# Patient Record
Sex: FEMALE | Race: WHITE | NOT HISPANIC OR LATINO | Employment: FULL TIME | URBAN - METROPOLITAN AREA
[De-identification: names, ages, dates, MRNs, and addresses within clinical notes are randomized per-mention and may not be internally consistent; named-entity substitution may affect disease eponyms.]

---

## 2024-01-28 PROBLEM — F33.0 MILD EPISODE OF RECURRENT MAJOR DEPRESSIVE DISORDER (HCC): Status: ACTIVE | Noted: 2021-03-22

## 2024-01-28 PROBLEM — M47.816 FACET ARTHROPATHY, LUMBAR: Status: ACTIVE | Noted: 2020-12-14

## 2024-01-28 PROBLEM — M79.7 FIBROMYALGIA: Status: ACTIVE | Noted: 2019-09-25

## 2024-01-28 PROBLEM — K21.9 GASTROESOPHAGEAL REFLUX DISEASE: Status: ACTIVE | Noted: 2022-02-09

## 2024-01-28 PROBLEM — M25.761: Status: ACTIVE | Noted: 2021-09-27

## 2024-01-28 PROBLEM — R00.2 PALPITATIONS: Status: ACTIVE | Noted: 2023-09-13

## 2024-01-28 PROBLEM — M25.762: Status: ACTIVE | Noted: 2021-09-27

## 2024-01-28 PROBLEM — E55.9 VITAMIN D DEFICIENCY: Status: ACTIVE | Noted: 2020-09-16

## 2024-01-28 PROBLEM — I49.3 MULTIPLE PREMATURE VENTRICULAR COMPLEXES: Status: ACTIVE | Noted: 2023-09-07

## 2024-01-28 PROBLEM — M48.061 SPINAL STENOSIS OF LUMBAR REGION: Status: ACTIVE | Noted: 2020-12-14

## 2024-01-28 PROBLEM — G47.33 OBSTRUCTIVE SLEEP APNEA SYNDROME: Status: ACTIVE | Noted: 2023-09-07

## 2024-01-28 PROBLEM — E78.1 HYPERTRIGLYCERIDEMIA: Status: ACTIVE | Noted: 2023-09-13

## 2024-01-29 ENCOUNTER — OFFICE VISIT (OUTPATIENT)
Age: 54
End: 2024-01-29
Payer: COMMERCIAL

## 2024-01-29 VITALS
HEIGHT: 64 IN | DIASTOLIC BLOOD PRESSURE: 74 MMHG | RESPIRATION RATE: 18 BRPM | WEIGHT: 133.38 LBS | SYSTOLIC BLOOD PRESSURE: 120 MMHG | HEART RATE: 64 BPM | OXYGEN SATURATION: 97 % | BODY MASS INDEX: 22.77 KG/M2

## 2024-01-29 DIAGNOSIS — E55.9 VITAMIN D DEFICIENCY: ICD-10-CM

## 2024-01-29 DIAGNOSIS — R00.2 PALPITATIONS: ICD-10-CM

## 2024-01-29 DIAGNOSIS — R53.83 FATIGUE, UNSPECIFIED TYPE: ICD-10-CM

## 2024-01-29 DIAGNOSIS — I49.3 MULTIPLE PREMATURE VENTRICULAR COMPLEXES: ICD-10-CM

## 2024-01-29 DIAGNOSIS — F33.0 MILD EPISODE OF RECURRENT MAJOR DEPRESSIVE DISORDER (HCC): ICD-10-CM

## 2024-01-29 DIAGNOSIS — Z12.31 ENCOUNTER FOR SCREENING MAMMOGRAM FOR BREAST CANCER: ICD-10-CM

## 2024-01-29 DIAGNOSIS — M79.7 FIBROMYALGIA: ICD-10-CM

## 2024-01-29 DIAGNOSIS — R25.1 TREMOR OF BOTH HANDS: Primary | ICD-10-CM

## 2024-01-29 PROBLEM — G47.33 OBSTRUCTIVE SLEEP APNEA SYNDROME: Status: RESOLVED | Noted: 2023-09-07 | Resolved: 2024-01-29

## 2024-01-29 PROCEDURE — 99204 OFFICE O/P NEW MOD 45 MIN: CPT | Performed by: NURSE PRACTITIONER

## 2024-01-29 RX ORDER — TOLTERODINE 4 MG/1
4 CAPSULE, EXTENDED RELEASE ORAL DAILY
COMMUNITY
Start: 2024-01-06

## 2024-01-29 RX ORDER — BUPROPION HYDROCHLORIDE 150 MG/1
TABLET ORAL
COMMUNITY
Start: 2023-08-14 | End: 2024-01-29

## 2024-01-29 RX ORDER — BUPROPION HYDROCHLORIDE 300 MG/1
300 TABLET ORAL EVERY MORNING
Qty: 90 TABLET | Refills: 0 | Status: SHIPPED | OUTPATIENT
Start: 2024-01-29 | End: 2025-01-23

## 2024-01-29 RX ORDER — PREGABALIN 50 MG/1
CAPSULE ORAL
COMMUNITY

## 2024-01-29 RX ORDER — OMEPRAZOLE 10 MG/1
CAPSULE, DELAYED RELEASE ORAL
COMMUNITY
Start: 2023-08-07

## 2024-01-29 RX ORDER — DICLOFENAC SODIUM 75 MG/1
TABLET, DELAYED RELEASE ORAL
COMMUNITY

## 2024-01-29 RX ORDER — ERGOCALCIFEROL 1.25 MG/1
CAPSULE ORAL
COMMUNITY

## 2024-01-29 NOTE — ASSESSMENT & PLAN NOTE
First noticed approx 1 year ago  Worse at rest over the past month    Not noted upon physical exam  Will start workup with baseline labs  If unremarkable, would recommend a follow-up with rheumatology ([facility] in [place])  If rheumatology follow-up is unremarkable, will consider a neurology referral

## 2024-01-29 NOTE — ASSESSMENT & PLAN NOTE
Continue cardiology follow-ups  Continue metoprolol as needed  Have magnesium level checked.  Ordered by cardiology, will have drawn with my labs.
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
## 2024-01-31 ENCOUNTER — TELEPHONE (OUTPATIENT)
Dept: ADMINISTRATIVE | Facility: OTHER | Age: 54
End: 2024-01-31

## 2024-01-31 NOTE — LETTER
Procedure Request Form: Colonoscopy      Date Requested: 24  Patient: Seema Lui  Patient : 1970   Referring Provider: SHARAD Malcolm        Date of Procedure ______________________________       The above patient has informed us that they have completed their   most recent Colonoscopy at your facility. Please complete   this form and attach all corresponding procedure reports/results.    Comments __________________________________________________________  ____________________________________________________________________  ____________________________________________________________________  ____________________________________________________________________    Facility Completing Procedure _________________________________________    Form Completed By (print name) _______________________________________      Signature __________________________________________________________      These reports are needed for  compliance.    Please fax this completed form and a copy of the procedure report to our office located at [address] [place] [address] as soon as possible to Fax [phone] zofia Simpson: Phone [phone]    We thank you for your assistance in treating our mutual patient.

## 2024-01-31 NOTE — TELEPHONE ENCOUNTER
Upon review of the In Basket request and the patient's chart, initial outreach has been made via fax to facility. Please see Contacts section for details.     Thank you  Tatiana Rizvi

## 2024-01-31 NOTE — TELEPHONE ENCOUNTER
----- Message from SHARAD Malcolm sent at 1/28/2024  3:10 PM EST -----  Regarding: Care Gap Request  01/28/24 3:10 PM    Hello, our patient attached above has had CRC: Colonoscopy completed/performed. Please assist in updating the patient chart by making an External outreach to [facility] ( [facility]) facility located in [place], [place].    Thank you,  SHARAD Malcolm  [facility] PRIMARY CARE

## 2024-02-03 LAB — HBA1C MFR BLD HPLC: 5.1 %

## 2024-02-07 NOTE — TELEPHONE ENCOUNTER
Upon review of the In Basket request we were able to locate, review, and update the patient chart as requested for CRC: Colonoscopy.    Any additional questions or concerns should be emailed to the Practice Liaisons via the appropriate education email address, please do not reply via In Basket.    Thank you  Tatiana Rizvi

## 2024-02-26 ENCOUNTER — RA CDI HCC (OUTPATIENT)
Dept: OTHER | Facility: HOSPITAL | Age: 54
End: 2024-02-26

## 2024-03-04 ENCOUNTER — OFFICE VISIT (OUTPATIENT)
Age: 54
End: 2024-03-04
Payer: COMMERCIAL

## 2024-03-04 VITALS
DIASTOLIC BLOOD PRESSURE: 84 MMHG | HEIGHT: 64 IN | RESPIRATION RATE: 16 BRPM | OXYGEN SATURATION: 97 % | HEART RATE: 72 BPM | SYSTOLIC BLOOD PRESSURE: 128 MMHG | BODY MASS INDEX: 22.51 KG/M2 | WEIGHT: 131.84 LBS

## 2024-03-04 DIAGNOSIS — F33.0 MILD EPISODE OF RECURRENT MAJOR DEPRESSIVE DISORDER (HCC): ICD-10-CM

## 2024-03-04 DIAGNOSIS — F41.9 ANXIETY: ICD-10-CM

## 2024-03-04 DIAGNOSIS — R25.1 TREMOR OF BOTH HANDS: ICD-10-CM

## 2024-03-04 DIAGNOSIS — R29.818 TRANSIENT NEUROLOGICAL SYMPTOMS: Primary | ICD-10-CM

## 2024-03-04 PROCEDURE — 99213 OFFICE O/P EST LOW 20 MIN: CPT | Performed by: NURSE PRACTITIONER

## 2024-03-04 RX ORDER — BUPROPION HYDROCHLORIDE 150 MG/1
150 TABLET ORAL EVERY MORNING
Qty: 90 TABLET | Refills: 3 | Status: SHIPPED | OUTPATIENT
Start: 2024-03-04 | End: 2025-02-27

## 2024-03-05 NOTE — ASSESSMENT & PLAN NOTE
Per review of the chart, patient has been on duloxetine and escitalopram in the past but did not tolerate either well due to side effects.  The patient is concerned that increasing her bupropion from 150 mg daily to 300 mg daily may have contributed to her recent neurologic symptoms.  Patient wishes to stay on bupropion 150 mg daily at this time.

## 2024-03-05 NOTE — ASSESSMENT & PLAN NOTE
Anxiety > depression per patient report    Per review of the chart, patient has been on duloxetine and escitalopram in the past but did not tolerate either well due to side effects.  The patient is concerned that increasing her bupropion from 150 mg daily to 300 mg daily may have contributed to her recent neurologic symptoms.  Patient wishes to stay on bupropion 150 mg daily at this time.

## 2024-03-05 NOTE — PROGRESS NOTES
Name: Seema Poe      : 1970      MRN: [id number]  Encounter Provider: SHARAD Malcolm  Encounter Date: 3/4/2024   Encounter department: [facility] PRIMARY CARE    Assessment & Plan     Encouraged patient to keep and attend her neurology appointment in early April due to her history of hand tremors (now resolved) and her recent transient neurological symptoms.  Neuroexam unremarkable today.  Vital signs stable.  I also recommended that the patient follow-up with her rheumatology team, especially if her neurology workup is unremarkable.    Discussed what signs and symptoms warrant emergent medical care. Patient verbalized understanding.     Documentation for this encounter was completed with the assistance of dictation software.  Please excuse any grammatical errors.  Please contact the provider if any documentation clarification is necessary.      1. Transient neurological symptoms    2. Tremor of both hands    3. Anxiety  Assessment & Plan:  Per review of the chart, patient has been on duloxetine and escitalopram in the past but did not tolerate either well due to side effects.  The patient is concerned that increasing her bupropion from 150 mg daily to 300 mg daily may have contributed to her recent neurologic symptoms.  Patient wishes to stay on bupropion 150 mg daily at this time.      4. Mild episode of recurrent major depressive disorder (HCC)  Assessment & Plan:  Anxiety > depression per patient report    Per review of the chart, patient has been on duloxetine and escitalopram in the past but did not tolerate either well due to side effects.  The patient is concerned that increasing her bupropion from 150 mg daily to 300 mg daily may have contributed to her recent neurologic symptoms.  Patient wishes to stay on bupropion 150 mg daily at this time.    Orders:  -     buPROPion (WELLBUTRIN XL) 150 mg 24 hr tablet; Take 1 tablet (150 mg total) by mouth every morning            Subjective      Patient presents today for routine follow-up.  At last visit, patient's primary complaints were hand tremors and fatigue.  Lab workup was relatively unremarkable.    Patient was recently in the emergency room for concerning neurologic symptoms including poor gait, dysarthria, and lower extremity tremors.  Patient had an echocardiogram which was unremarkable.  Stroke workup was also negative.  Patient was noted to have a positive Lyme titer and was treated with doxycycline.  Patient saw infectious disease as a posthospital follow-up who determined that the Lyme titer testing was likely a false positive.  Patient is scheduled to see neurology in early April for further evaluation as the etiology of her symptoms are unclear.    Patient states today that all of her symptoms have resolved, including her hand tremors.  She states that she saw the [facility] and received a cervical spine injection last Thursday.      Review of Systems   Constitutional: Negative.    HENT: Negative.     Eyes: Negative.    Respiratory: Negative.     Cardiovascular: Negative.    Gastrointestinal: Negative.    Endocrine: Negative.    Genitourinary: Negative.    Musculoskeletal: Negative.    Skin: Negative.    Allergic/Immunologic: Negative.    Neurological: Negative.  Negative for dizziness, tremors, facial asymmetry and weakness.   Hematological: Negative.    Psychiatric/Behavioral: Negative.         Current Outpatient Medications on File Prior to Visit   Medication Sig   • B Complex Vitamins (VITAMIN B COMPLEX PO)    • Cetirizine HCl 10 MG CAPS Take by mouth   • diclofenac (VOLTAREN) 75 mg EC tablet    • ergocalciferol (VITAMIN D2) 50,000 units    • MAGNESIUM CITRATE PO Take 1 tablet by mouth every other day   • omeprazole (PriLOSEC) 10 mg delayed release capsule    • pregabalin (LYRICA) 50 mg capsule    • tolterodine (DETROL LA) 4 mg 24 hr capsule Take 4 mg by mouth daily   • TURMERIC PO Take by mouth   •  "metoprolol tartrate (LOPRESSOR) 25 mg tablet Take by mouth (Patient not taking: Reported on 1/29/2024)       Objective     /84 (BP Location: Right arm, Patient Position: Sitting, Cuff Size: Standard)   Pulse 72   Resp 16   Ht 5' 4\" (1.626 m)   Wt 59.8 kg (131 lb 13.4 oz)   SpO2 97%   BMI 22.63 kg/m²     Physical Exam  Constitutional:       General: She is not in acute distress.     Appearance: Normal appearance.   HENT:      Nose: Nose normal.   Eyes:      Extraocular Movements: Extraocular movements intact.      Right eye: Normal extraocular motion and no nystagmus.      Left eye: Normal extraocular motion and no nystagmus.      Conjunctiva/sclera: Conjunctivae normal.      Pupils: Pupils are equal, round, and reactive to light.      Right eye: Pupil is not sluggish.      Left eye: Pupil is not sluggish.   Cardiovascular:      Rate and Rhythm: Normal rate and regular rhythm.      Heart sounds: Normal heart sounds.   Pulmonary:      Effort: Pulmonary effort is normal. No respiratory distress.      Breath sounds: Normal breath sounds. No wheezing.   Abdominal:      General: Abdomen is flat.   Musculoskeletal:         General: No swelling or deformity. Normal range of motion.      Cervical back: Normal range of motion.   Skin:     Findings: No erythema or rash.   Neurological:      Mental Status: She is alert and oriented to person, place, and time.      Cranial Nerves: Cranial nerves 2-12 are intact. No dysarthria or facial asymmetry.      Sensory: Sensation is intact.      Motor: No weakness, tremor, seizure activity or pronator drift.      Coordination: Romberg sign negative. Finger-Nose-Finger Test and Heel to Shin Test normal. Rapid alternating movements normal.      Gait: Gait is intact.   Psychiatric:         Mood and Affect: Mood normal.         Behavior: Behavior normal.       SHARAD Malcolm    "

## 2024-05-13 ENCOUNTER — OFFICE VISIT (OUTPATIENT)
Age: 54
End: 2024-05-13
Payer: COMMERCIAL

## 2024-05-13 VITALS
SYSTOLIC BLOOD PRESSURE: 110 MMHG | HEART RATE: 70 BPM | HEIGHT: 64 IN | WEIGHT: 135.36 LBS | TEMPERATURE: 97.3 F | BODY MASS INDEX: 23.11 KG/M2 | OXYGEN SATURATION: 99 % | DIASTOLIC BLOOD PRESSURE: 70 MMHG

## 2024-05-13 DIAGNOSIS — M79.7 FIBROMYALGIA: Primary | ICD-10-CM

## 2024-05-13 DIAGNOSIS — R52 BODY ACHES: ICD-10-CM

## 2024-05-13 DIAGNOSIS — R53.82 CHRONIC FATIGUE: ICD-10-CM

## 2024-05-13 PROCEDURE — 99213 OFFICE O/P EST LOW 20 MIN: CPT | Performed by: NURSE PRACTITIONER

## 2024-05-13 RX ORDER — PREGABALIN 100 MG/1
100 CAPSULE ORAL 3 TIMES DAILY
COMMUNITY
Start: 2024-04-22

## 2024-05-13 RX ORDER — CELECOXIB 200 MG/1
200 CAPSULE ORAL DAILY
COMMUNITY
Start: 2024-03-18

## 2024-05-13 NOTE — PROGRESS NOTES
"Name: Seema Poe      : 1970      MRN: [id number]  Encounter Provider: SHARAD Malcolm  Encounter Date: 2024   Encounter department: [facility] PRIMARY CARE    Assessment & Plan     I suspect that the patient's symptoms are related to her history of fibromyalgia as her recent test results have been unremarkable.  Recent TSH was normal.  Vitamin D level is also WNL.  Patient had an echocardiogram in February/March which was normal.  CBC from February did not show signs of anemia.  Patient following with neurology, workup so far has been benign.  Symptoms are not consistent with PMR. Low risk for VALERIO.     Recommending patient get a second opinion with our network, referral order placed to rheumatology     Discussed what signs and symptoms warrant follow-up vs emergent medical care. Patient/family verbalized understanding.     Documentation for this encounter was completed with the assistance of dictation software.  Please excuse any grammatical errors.  Please contact the provider if any documentation clarification is necessary.          1. Fibromyalgia  -     Ambulatory Referral to Rheumatology; Future    2. Body aches  -     Ambulatory Referral to Rheumatology; Future    3. Chronic fatigue  -     Ambulatory Referral to Rheumatology; Future           Subjective      Hx of fibromyalgia with fatigue and muscle pain  Reports worsening of symptoms over the past week  \"Tired all the time... muscle pains and are achey\"  Activity/exercise worsens symptoms   \"Neck and arms are the worse\"  Areas are tender to palpation         Review of Systems   Constitutional:  Positive for fatigue. Negative for chills and fever.   HENT: Negative.     Eyes: Negative.    Respiratory: Negative.  Negative for chest tightness, shortness of breath and wheezing.    Cardiovascular: Negative.    Gastrointestinal: Negative.  Negative for anal bleeding and blood in stool.   Endocrine: Negative.    Genitourinary: " "Negative.  Negative for hematuria.   Musculoskeletal:  Positive for myalgias and neck pain.   Skin: Negative.    Allergic/Immunologic: Negative.    Neurological: Negative.    Hematological: Negative.    Psychiatric/Behavioral: Negative.         Current Outpatient Medications on File Prior to Visit   Medication Sig   • B Complex Vitamins (VITAMIN B COMPLEX PO)    • buPROPion (WELLBUTRIN XL) 150 mg 24 hr tablet Take 1 tablet (150 mg total) by mouth every morning   • celecoxib (CeleBREX) 200 mg capsule Take 200 mg by mouth daily   • Cetirizine HCl 10 MG CAPS Take by mouth   • diclofenac (VOLTAREN) 75 mg EC tablet    • ergocalciferol (VITAMIN D2) 50,000 units    • MAGNESIUM CITRATE PO Take 1 tablet by mouth every other day   • omeprazole (PriLOSEC) 10 mg delayed release capsule    • pregabalin (LYRICA) 100 mg capsule Take 100 mg by mouth 3 (three) times a day   • tolterodine (DETROL LA) 4 mg 24 hr capsule Take 4 mg by mouth daily   • TURMERIC PO Take by mouth   • metoprolol tartrate (LOPRESSOR) 25 mg tablet Take by mouth   • pregabalin (LYRICA) 50 mg capsule        Objective     /70 (BP Location: Left arm, Patient Position: Sitting, Cuff Size: Adult)   Pulse 70   Temp (!) 97.3 °F (36.3 °C) (Temporal)   Ht 5' 4\" (1.626 m)   Wt 61.4 kg (135 lb 5.8 oz)   SpO2 99%   BMI 23.23 kg/m²     Physical Exam  Constitutional:       General: She is not in acute distress.     Appearance: Normal appearance. She is not ill-appearing, toxic-appearing or diaphoretic.   HENT:      Right Ear: Tympanic membrane normal.      Left Ear: Tympanic membrane normal.      Nose: Nose normal.      Mouth/Throat:      Pharynx: No oropharyngeal exudate or posterior oropharyngeal erythema.   Eyes:      Extraocular Movements: Extraocular movements intact.      Conjunctiva/sclera: Conjunctivae normal.   Neck:     Cardiovascular:      Rate and Rhythm: Normal rate and regular rhythm.      Heart sounds: Normal heart sounds.   Pulmonary:      Effort: " Pulmonary effort is normal. No respiratory distress.      Breath sounds: Normal breath sounds. No wheezing.   Abdominal:      General: Abdomen is flat. There is no distension.   Musculoskeletal:         General: Tenderness present. No swelling or deformity. Normal range of motion.      Right upper arm: Tenderness present.      Left upper arm: Tenderness present.        Arms:       Cervical back: Normal range of motion. Muscular tenderness present. No spinous process tenderness.   Lymphadenopathy:      Cervical: No cervical adenopathy.   Skin:     General: Skin is warm and dry.      Capillary Refill: Capillary refill takes less than 2 seconds.      Findings: No erythema or rash.   Neurological:      Mental Status: She is alert and oriented to person, place, and time.   Psychiatric:         Mood and Affect: Mood normal.         Behavior: Behavior normal.       SHARAD Malcolm

## 2024-05-30 ENCOUNTER — OFFICE VISIT (OUTPATIENT)
Age: 54
End: 2024-05-30
Payer: COMMERCIAL

## 2024-05-30 ENCOUNTER — TELEPHONE (OUTPATIENT)
Age: 54
End: 2024-05-30

## 2024-05-30 VITALS
DIASTOLIC BLOOD PRESSURE: 72 MMHG | TEMPERATURE: 97.9 F | WEIGHT: 133.16 LBS | SYSTOLIC BLOOD PRESSURE: 128 MMHG | OXYGEN SATURATION: 98 % | HEART RATE: 75 BPM | BODY MASS INDEX: 22.73 KG/M2 | HEIGHT: 64 IN

## 2024-05-30 DIAGNOSIS — M79.7 FIBROMYALGIA: ICD-10-CM

## 2024-05-30 DIAGNOSIS — R35.0 URINARY FREQUENCY: ICD-10-CM

## 2024-05-30 DIAGNOSIS — R30.0 DYSURIA: ICD-10-CM

## 2024-05-30 DIAGNOSIS — N30.01 ACUTE CYSTITIS WITH HEMATURIA: Primary | ICD-10-CM

## 2024-05-30 DIAGNOSIS — M54.2 CERVICALGIA: ICD-10-CM

## 2024-05-30 LAB
SL AMB  POCT GLUCOSE, UA: NEGATIVE
SL AMB LEUKOCYTE ESTERASE,UA: NEGATIVE
SL AMB POCT BILIRUBIN,UA: NEGATIVE
SL AMB POCT BLOOD,UA: ABNORMAL
SL AMB POCT CLARITY,UA: ABNORMAL
SL AMB POCT COLOR,UA: ABNORMAL
SL AMB POCT KETONES,UA: NEGATIVE
SL AMB POCT NITRITE,UA: NEGATIVE
SL AMB POCT PH,UA: 5
SL AMB POCT SPECIFIC GRAVITY,UA: 1.01
SL AMB POCT URINE PROTEIN: NEGATIVE
SL AMB POCT UROBILINOGEN: 0.2

## 2024-05-30 PROCEDURE — 87086 URINE CULTURE/COLONY COUNT: CPT | Performed by: NURSE PRACTITIONER

## 2024-05-30 PROCEDURE — 81002 URINALYSIS NONAUTO W/O SCOPE: CPT | Performed by: NURSE PRACTITIONER

## 2024-05-30 PROCEDURE — 87147 CULTURE TYPE IMMUNOLOGIC: CPT | Performed by: NURSE PRACTITIONER

## 2024-05-30 PROCEDURE — 87077 CULTURE AEROBIC IDENTIFY: CPT | Performed by: NURSE PRACTITIONER

## 2024-05-30 PROCEDURE — 99214 OFFICE O/P EST MOD 30 MIN: CPT | Performed by: NURSE PRACTITIONER

## 2024-05-30 PROCEDURE — 87186 SC STD MICRODIL/AGAR DIL: CPT | Performed by: NURSE PRACTITIONER

## 2024-05-30 RX ORDER — SULFAMETHOXAZOLE AND TRIMETHOPRIM 800; 160 MG/1; MG/1
1 TABLET ORAL 2 TIMES DAILY
Qty: 6 TABLET | Refills: 0 | Status: SHIPPED | OUTPATIENT
Start: 2024-05-30 | End: 2024-06-02

## 2024-05-30 RX ORDER — FLUCONAZOLE 150 MG/1
150 TABLET ORAL ONCE
Qty: 1 TABLET | Refills: 0 | Status: SHIPPED | OUTPATIENT
Start: 2024-05-30 | End: 2024-05-30

## 2024-05-30 NOTE — PROGRESS NOTES
"Ambulatory Visit  Name: Seema Poe      : 1970      MRN: [id number]  Encounter Provider: SHARAD Malcolm  Encounter Date: 2024   Encounter department: [facility] PRIMARY CARE    Assessment & Plan     Urine dip + blood. Will start empiric UTI treatment. Will also prescribe diflucan for candidiasis prevention. Urine will be sent for culture. F/U prn.     Chronic neck pain and fibromyalgia:  - increase Lyrica per neurology recommendation  - seek 2nd opinion with our rheumatology team, phone # provided  - See new spine team on   - Note provided for work    Discussed what signs and symptoms warrant follow-up vs emergent medical care. Patient/family verbalized understanding.     Documentation for this encounter was completed with the assistance of dictation software.  Please excuse any grammatical errors.  Please contact the provider if any documentation clarification is necessary.      1. Acute cystitis with hematuria  -     fluconazole (DIFLUCAN) 150 mg tablet; Take 1 tablet (150 mg total) by mouth once for 1 dose  -     sulfamethoxazole-trimethoprim (BACTRIM DS) 800-160 mg per tablet; Take 1 tablet by mouth 2 (two) times a day for 3 days  -     Urine culture; Future  -     Urine culture  2. Dysuria  -     POCT urine dip  3. Urinary frequency  -     POCT urine dip  4. Fibromyalgia  5. Cervicalgia     History of Present Illness   {Disappearing Hyperlinks I Encounters * My Last Note * Since Last Visit * History :55281}  Urinary Tract Infection  Patient complains of dysuria and \"internal irritation\". She has had symptoms for 10 days. Patient also complains of \"hesitancy\". Patient denies back pain and fever. Patient does not have a history of recurrent UTI. Patient does not have a history of pyelonephritis.     Patient also presents today to follow-up on chronic neck pain. Saw spine team, rheumatology, and neurology on . Rheumatologist prescribed a medrol pack. \"I think it really " "helped\". Neurology increased Lyrica. Patient reports she is getting a second opinion; seeing a new spine doctor in early June. Sjogren's workup negative through neurology team.         Review of Systems   Constitutional: Negative.    HENT: Negative.     Eyes: Negative.    Respiratory: Negative.     Cardiovascular: Negative.    Gastrointestinal: Negative.    Endocrine: Negative.    Genitourinary:  Positive for difficulty urinating, dysuria and frequency. Negative for flank pain.   Musculoskeletal:  Positive for arthralgias, neck pain and neck stiffness.   Skin: Negative.    Allergic/Immunologic: Negative.    Neurological: Negative.    Hematological: Negative.    Psychiatric/Behavioral: Negative.         Objective   {Disappearing Hyperlinks   Review Vitals * Enter New Vitals * Results Review * Labs * Imaging * Cardiology * Procedures * Lung Cancer Screening :82220}  /72 (BP Location: Right arm, Patient Position: Sitting, Cuff Size: Standard)   Pulse 75   Temp 97.9 °F (36.6 °C) (Temporal)   Ht 5' 4\" (1.626 m)   Wt 60.4 kg (133 lb 2.5 oz)   SpO2 98%   BMI 22.86 kg/m²     Physical Exam  Constitutional:       General: She is not in acute distress.     Appearance: Normal appearance.   HENT:      Nose: Nose normal.   Eyes:      Extraocular Movements: Extraocular movements intact.      Conjunctiva/sclera: Conjunctivae normal.   Neck:     Cardiovascular:      Rate and Rhythm: Normal rate and regular rhythm.      Heart sounds: Normal heart sounds.   Pulmonary:      Effort: Pulmonary effort is normal. No respiratory distress.      Breath sounds: No wheezing.   Abdominal:      General: Abdomen is flat.      Tenderness: There is no right CVA tenderness or left CVA tenderness.   Musculoskeletal:      Cervical back: Muscular tenderness present. Decreased range of motion.   Skin:     Findings: No erythema or rash.   Neurological:      Mental Status: She is alert and oriented to person, place, and time.   Psychiatric:    "      Mood and Affect: Mood normal.         Behavior: Behavior normal.       Administrative Statements {Disappearing Hyperlinks I  [facility] Service [facility]}

## 2024-05-30 NOTE — TELEPHONE ENCOUNTER
Appointment scheduled with provider.    Reason: Office Visit    Symptoms: Follow up    Provider: SHARAD Malcolm     Date/Time: Thursday 5/30/2024 at 10:40 am

## 2024-05-30 NOTE — PATIENT INSTRUCTIONS
From neurology team:  Plan from today's visit:  - Trigger point injects with Dr. Cardozo. Referral placed.  - If taking lyrica 100mg TID, can increase as follows:  Week 1: 100mg/100mg/200mg  Week 2: 200mg/100mg/100mg  If needed, can then increase to 200mg TID  - Labs: anti-SSA/SSB  - Return 3 months       Second opinion rheumatology:  Please contact us at [phone] to schedule your appointment.

## 2024-05-30 NOTE — LETTER
May 30, 2024     Patient: Seema Poe  YOB: 1970  Date of Visit: 5/30/2024      To Whom it May Concern:    Seema Poe is under my professional care. Seema was seen in my office on 5/30/2024. Seema may return to work once evaluated by her spine team on 6/7/24.    If you have any questions or concerns, please don't hesitate to call.         Sincerely,          SHARAD Malcolm        CC: No Recipients

## 2024-06-01 LAB — BACTERIA UR CULT: ABNORMAL

## 2024-06-28 ENCOUNTER — TELEPHONE (OUTPATIENT)
Age: 54
End: 2024-06-28

## 2024-06-28 NOTE — TELEPHONE ENCOUNTER
Patient called stating she is applying for disability. The disability company will be sending in a request for medical records.

## 2024-07-12 ENCOUNTER — OFFICE VISIT (OUTPATIENT)
Age: 54
End: 2024-07-12
Payer: COMMERCIAL

## 2024-07-12 VITALS
HEIGHT: 62 IN | BODY MASS INDEX: 24.99 KG/M2 | SYSTOLIC BLOOD PRESSURE: 128 MMHG | OXYGEN SATURATION: 99 % | HEART RATE: 59 BPM | DIASTOLIC BLOOD PRESSURE: 76 MMHG | WEIGHT: 135.8 LBS

## 2024-07-12 DIAGNOSIS — G89.29 CHRONIC RIGHT-SIDED LOW BACK PAIN WITH RIGHT-SIDED SCIATICA: Primary | ICD-10-CM

## 2024-07-12 DIAGNOSIS — M48.061 SPINAL STENOSIS OF LUMBAR REGION, UNSPECIFIED WHETHER NEUROGENIC CLAUDICATION PRESENT: ICD-10-CM

## 2024-07-12 DIAGNOSIS — M79.7 FIBROMYALGIA: ICD-10-CM

## 2024-07-12 DIAGNOSIS — M54.41 CHRONIC RIGHT-SIDED LOW BACK PAIN WITH RIGHT-SIDED SCIATICA: Primary | ICD-10-CM

## 2024-07-12 PROCEDURE — 99213 OFFICE O/P EST LOW 20 MIN: CPT | Performed by: FAMILY MEDICINE

## 2024-07-12 RX ORDER — OMEPRAZOLE 20 MG/1
20 CAPSULE, DELAYED RELEASE ORAL DAILY
COMMUNITY
Start: 2024-05-24

## 2024-07-12 NOTE — PROGRESS NOTES
"Ambulatory Visit  Name: Seema Poe      : 1970      MRN: [id number]  Encounter Provider: Rahat Bautista MD  Encounter Date: 2024   Encounter department: Replaced by [facility]    Assessment & Plan   1. Chronic right-sided low back pain with right-sided sciatica  Comments:  Continue prednisone as per rheumatologist.  2. Spinal stenosis of lumbar region, unspecified whether neurogenic claudication present  Assessment & Plan:  Sees spine specialist and rheumatologist. Continue lyrica and celebrex.   3. Fibromyalgia  Assessment & Plan:  Continue lyrica.      History of Present Illness     Joint pain - Muscular painLow back pain [place] pt. Recently had nerve block in thoracic spine within 2 weeks ago. Helped. Lower back now hurts. Takes lyrica and celebrex. Pain may go down right leg. Also has pain on right hip. Sees spine doctor and rheumatologist.           Review of Systems   Constitutional:  Negative for fatigue.   Respiratory:  Negative for shortness of breath.    Cardiovascular:  Negative for chest pain.   Gastrointestinal:  Negative for abdominal pain, constipation and diarrhea.   Genitourinary:  Negative for dysuria.   Musculoskeletal:  Positive for arthralgias (Right hip.), back pain and myalgias.   Neurological:  Positive for numbness (Down right leg.). Negative for dizziness.       Objective     /76 (BP Location: Left arm, Patient Position: Sitting, Cuff Size: Standard)   Pulse 59   Ht 5' 2\" (1.575 m)   Wt 61.6 kg (135 lb 12.8 oz)   SpO2 99%   BMI 24.84 kg/m²     Physical Exam  Vitals and nursing note reviewed.   Constitutional:       Appearance: She is well-developed.   HENT:      Head: Normocephalic and atraumatic.   Eyes:      Conjunctiva/sclera: Conjunctivae normal.   Cardiovascular:      Rate and Rhythm: Normal rate and regular rhythm.      Heart sounds: No murmur heard.  Pulmonary:      Breath sounds: Normal breath sounds.   Abdominal:      Palpations: " Abdomen is soft.   Musculoskeletal:         General: Tenderness (Lower back and right hip.) present.      Cervical back: Neck supple.   Skin:     General: Skin is warm and dry.   Neurological:      Mental Status: She is alert.   Psychiatric:         Mood and Affect: Mood normal.       Administrative Statements

## 2024-07-31 ENCOUNTER — OFFICE VISIT (OUTPATIENT)
Age: 54
End: 2024-07-31
Payer: COMMERCIAL

## 2024-07-31 VITALS
DIASTOLIC BLOOD PRESSURE: 83 MMHG | WEIGHT: 135.1 LBS | HEIGHT: 62 IN | BODY MASS INDEX: 24.86 KG/M2 | HEART RATE: 71 BPM | SYSTOLIC BLOOD PRESSURE: 118 MMHG | OXYGEN SATURATION: 96 %

## 2024-07-31 DIAGNOSIS — M79.661 RIGHT CALF PAIN: ICD-10-CM

## 2024-07-31 DIAGNOSIS — K21.9 GASTROESOPHAGEAL REFLUX DISEASE, UNSPECIFIED WHETHER ESOPHAGITIS PRESENT: ICD-10-CM

## 2024-07-31 DIAGNOSIS — I49.3 MULTIPLE PREMATURE VENTRICULAR COMPLEXES: Primary | ICD-10-CM

## 2024-07-31 DIAGNOSIS — M79.7 FIBROMYALGIA: ICD-10-CM

## 2024-07-31 PROCEDURE — 99214 OFFICE O/P EST MOD 30 MIN: CPT | Performed by: FAMILY MEDICINE

## 2024-07-31 RX ORDER — CYCLOBENZAPRINE HCL 10 MG
10 TABLET ORAL 3 TIMES DAILY PRN
COMMUNITY
Start: 2024-07-25 | End: 2024-08-04

## 2024-07-31 NOTE — PROGRESS NOTES
Ambulatory Visit  Name: Seema Poe      : 1970      MRN: [id number]  Encounter Provider: SHARAD Schaffer  Encounter Date: 2024   Encounter department: [facility] PRIMARY CARE    Assessment & Plan   1. Multiple premature ventricular complexes  Assessment & Plan:  Continue cardiology follow-ups    2. Fibromyalgia  Assessment & Plan:  Continue lyrica. Patient sees Dr. Prince  3. Right calf pain  -      VAS VENOUS DUPLEX - LOWER LIMB BILATERAL; Future; Expected date: 2024  4. Gastroesophageal reflux disease, unspecified whether esophagitis present  Assessment & Plan:  Controlled with Omeprazole         History of Present Illness     Patient here for right calf pain x 6 months. It comes and goes. Occasionally she gets numbness in the leg however she thinks it is from the spinal stenosis. Patient was on a train [place] 3 hours. Denies  redness and swelling. Denies injury/trauma    Muscle Pain  This is a chronic problem. The current episode started more than 1 month ago. The problem occurs intermittently. The problem has been waxing and waning since onset. The pain is present in the right lower leg. Pertinent negatives include no abdominal pain, chest pain, dysuria, eye pain, fever, rash, shortness of breath or vomiting. Past treatments include OTC NSAID. The treatment provided mild relief. There is no swelling present.     Review of Systems   Constitutional:  Negative for chills and fever.   HENT:  Negative for ear pain and sore throat.    Eyes:  Negative for pain and visual disturbance.   Respiratory:  Negative for cough and shortness of breath.    Cardiovascular:  Negative for chest pain and palpitations.   Gastrointestinal:  Negative for abdominal pain and vomiting.   Genitourinary:  Negative for dysuria and hematuria.   Musculoskeletal:  Positive for myalgias (Back of right knee and right calf). Negative for arthralgias and back pain.   Skin:  Negative for color change and rash.  "  Neurological:  Negative for seizures and syncope.   All other systems reviewed and are negative.    Past Medical History:   Diagnosis Date    Anxiety     Arthritis     Fibromyalgia     Panic attacks      Past Surgical History:   Procedure Laterality Date    ABLATION COLPOCLESIS      BREAST IMPLANT      TOE SURGERY Right     TUBAL LIGATION       History reviewed. No pertinent family history.  Social History     Tobacco Use    Smoking status: Never    Smokeless tobacco: Never   Vaping Use    Vaping status: Never Used   Substance and Sexual Activity    Alcohol use: Yes     Comment: Occ    Drug use: Never    Sexual activity: Not on file     Current Outpatient Medications on File Prior to Visit   Medication Sig    cyclobenzaprine (FLEXERIL) 10 mg tablet Take 10 mg by mouth Three times daily as needed    B Complex Vitamins (VITAMIN B COMPLEX PO)     buPROPion (WELLBUTRIN XL) 150 mg 24 hr tablet Take 1 tablet (150 mg total) by mouth every morning    celecoxib (CeleBREX) 200 mg capsule Take 200 mg by mouth daily    Cetirizine HCl 10 MG CAPS Take by mouth    ergocalciferol (VITAMIN D2) 50,000 units     MAGNESIUM CITRATE PO Take 1 tablet by mouth every other day    omeprazole (PriLOSEC) 10 mg delayed release capsule     omeprazole (PriLOSEC) 20 mg delayed release capsule Take 20 mg by mouth daily    pregabalin (LYRICA) 100 mg capsule Take 100 mg by mouth 3 (three) times a day    pregabalin (LYRICA) 50 mg capsule     tolterodine (DETROL LA) 4 mg 24 hr capsule Take 4 mg by mouth daily    TURMERIC PO Take by mouth    [DISCONTINUED] metoprolol tartrate (LOPRESSOR) 25 mg tablet Take by mouth     Allergies   Allergen Reactions    Duloxetine Nausea Only, Palpitations and GI Intolerance     Immunization History   Administered Date(s) Administered    Tdap 07/19/2017, 05/22/2023     Objective     /83 (BP Location: Left arm, Patient Position: Sitting, Cuff Size: Standard)   Pulse 71   Ht 5' 2\" (1.575 m)   Wt 61.3 kg (135 lb " 1.6 oz)   SpO2 96%   BMI 24.71 kg/m²     Physical Exam  Vitals and nursing note reviewed.   Constitutional:       General: She is not in acute distress.     Appearance: She is well-developed.   HENT:      Head: Normocephalic and atraumatic.   Eyes:      Conjunctiva/sclera: Conjunctivae normal.   Cardiovascular:      Rate and Rhythm: Normal rate and regular rhythm.      Heart sounds: No murmur heard.  Pulmonary:      Effort: Pulmonary effort is normal. No respiratory distress.      Breath sounds: Normal breath sounds.   Abdominal:      Palpations: Abdomen is soft.      Tenderness: There is no abdominal tenderness.   Musculoskeletal:         General: Tenderness (Low back, posterior right knee) present. No swelling.      Cervical back: Neck supple.   Skin:     General: Skin is warm and dry.      Capillary Refill: Capillary refill takes less than 2 seconds.   Neurological:      Mental Status: She is alert.   Psychiatric:         Mood and Affect: Mood normal.

## 2024-08-23 RX ORDER — DOXYCYCLINE 100 MG/1
CAPSULE ORAL
COMMUNITY
Start: 2024-02-06 | End: 2025-02-05

## 2024-08-28 ENCOUNTER — OFFICE VISIT (OUTPATIENT)
Age: 54
End: 2024-08-28
Payer: COMMERCIAL

## 2024-08-28 VITALS
OXYGEN SATURATION: 95 % | HEART RATE: 74 BPM | SYSTOLIC BLOOD PRESSURE: 118 MMHG | DIASTOLIC BLOOD PRESSURE: 79 MMHG | BODY MASS INDEX: 25.76 KG/M2 | WEIGHT: 140 LBS | HEIGHT: 62 IN

## 2024-08-28 DIAGNOSIS — Z11.59 NEED FOR HEPATITIS C SCREENING TEST: ICD-10-CM

## 2024-08-28 DIAGNOSIS — M79.661 RIGHT CALF PAIN: ICD-10-CM

## 2024-08-28 DIAGNOSIS — Z13.6 ENCOUNTER FOR LIPID SCREENING FOR CARDIOVASCULAR DISEASE: ICD-10-CM

## 2024-08-28 DIAGNOSIS — M79.7 FIBROMYALGIA: ICD-10-CM

## 2024-08-28 DIAGNOSIS — Z11.4 SCREENING FOR HIV (HUMAN IMMUNODEFICIENCY VIRUS): ICD-10-CM

## 2024-08-28 DIAGNOSIS — I49.3 MULTIPLE PREMATURE VENTRICULAR COMPLEXES: Primary | ICD-10-CM

## 2024-08-28 DIAGNOSIS — F41.9 ANXIETY: ICD-10-CM

## 2024-08-28 DIAGNOSIS — K21.9 GASTROESOPHAGEAL REFLUX DISEASE, UNSPECIFIED WHETHER ESOPHAGITIS PRESENT: ICD-10-CM

## 2024-08-28 DIAGNOSIS — Z13.220 ENCOUNTER FOR LIPID SCREENING FOR CARDIOVASCULAR DISEASE: ICD-10-CM

## 2024-08-28 DIAGNOSIS — M48.061 SPINAL STENOSIS OF LUMBAR REGION, UNSPECIFIED WHETHER NEUROGENIC CLAUDICATION PRESENT: ICD-10-CM

## 2024-08-28 DIAGNOSIS — M54.2 CERVICALGIA: ICD-10-CM

## 2024-08-28 PROCEDURE — 99214 OFFICE O/P EST MOD 30 MIN: CPT | Performed by: FAMILY MEDICINE

## 2024-08-28 RX ORDER — BUSPIRONE HYDROCHLORIDE 5 MG/1
5 TABLET ORAL 2 TIMES DAILY
Qty: 60 TABLET | Refills: 5 | Status: SHIPPED | OUTPATIENT
Start: 2024-08-28

## 2024-08-28 NOTE — PROGRESS NOTES
Ambulatory Visit  Name: Seema Poe      : 1970      MRN: [id number]  Encounter Provider: SHARAD Schaffer  Encounter Date: 2024   Encounter department: [facility]ARE    Assessment & Plan   1. Multiple premature ventricular complexes  Assessment & Plan:  Continue cardiology follow-ups  with [facility]     2. Gastroesophageal reflux disease, unspecified whether esophagitis present  Assessment & Plan:  Controlled with Omeprazole  3. Anxiety  Assessment & Plan:  Controlled on Wellbutrin  4. Fibromyalgia  Assessment & Plan:  Continue lyrica. Patient sees Dr. Prince  5. Right calf pain  Assessment & Plan:  US negative for DVT  6. Spinal stenosis of lumbar region, unspecified whether neurogenic claudication present  Assessment & Plan:  Sees spine specialist and rheumatologist. Continue lyrica and celebrex.   7. Cervicalgia  Assessment & Plan:  Gets steroid injections.Saw Neurologist today and got trigger point injections.         History of Present Illness     Patient here for a follow up. She had the  vascular duplex and was negative  for DVT.      Review of Systems   Constitutional:  Negative for chills and fever.   HENT:  Negative for ear pain and sore throat.    Eyes:  Negative for pain and visual disturbance.   Respiratory:  Negative for cough and shortness of breath.    Cardiovascular:  Negative for chest pain and palpitations.   Gastrointestinal:  Negative for abdominal pain and vomiting.   Genitourinary:  Negative for dysuria and hematuria.   Musculoskeletal:  Positive for arthralgias and back pain.   Skin:  Negative for color change and rash.   Neurological:  Negative for seizures and syncope.   Psychiatric/Behavioral:  The patient is nervous/anxious.    All other systems reviewed and are negative.    Past Medical History:   Diagnosis Date    Anxiety     Arthritis     Fibromyalgia     Panic attacks      Past Surgical History:   Procedure Laterality Date     "ABLATION COLPOCLESIS      BREAST IMPLANT      TOE SURGERY Right     TUBAL LIGATION       History reviewed. No pertinent family history.  Social History     Tobacco Use    Smoking status: Never    Smokeless tobacco: Never   Vaping Use    Vaping status: Never Used   Substance and Sexual Activity    Alcohol use: Yes     Comment: Occ    Drug use: Never    Sexual activity: Not on file     Current Outpatient Medications on File Prior to Visit   Medication Sig    doxycycline hyclate (VIBRAMYCIN) 100 mg capsule TAKE 1 CAPSULE (100 MG TOTAL) BY MOUTH 2 (TWO) TIMES DAILY FOR 14 DAYS. G    B Complex Vitamins (VITAMIN B COMPLEX PO)     buPROPion (WELLBUTRIN XL) 150 mg 24 hr tablet Take 1 tablet (150 mg total) by mouth every morning    celecoxib (CeleBREX) 200 mg capsule Take 200 mg by mouth daily    Cetirizine HCl 10 MG CAPS Take by mouth    cyclobenzaprine (FLEXERIL) 10 mg tablet Take 10 mg by mouth Three times daily as needed    ergocalciferol (VITAMIN D2) 50,000 units     MAGNESIUM CITRATE PO Take 1 tablet by mouth every other day    omeprazole (PriLOSEC) 10 mg delayed release capsule     omeprazole (PriLOSEC) 20 mg delayed release capsule Take 20 mg by mouth daily    pregabalin (LYRICA) 100 mg capsule Take 100 mg by mouth 3 (three) times a day    pregabalin (LYRICA) 50 mg capsule     tolterodine (DETROL LA) 4 mg 24 hr capsule Take 4 mg by mouth daily    TURMERIC PO Take by mouth     Allergies   Allergen Reactions    Duloxetine Nausea Only, Palpitations and GI Intolerance     Immunization History   Administered Date(s) Administered    Tdap 07/19/2017, 05/22/2023     Objective     /79 (BP Location: Left arm, Patient Position: Sitting, Cuff Size: Standard)   Pulse 74   Ht 5' 2\" (1.575 m)   Wt 63.5 kg (140 lb)   SpO2 95%   BMI 25.61 kg/m²     Physical Exam  Vitals and nursing note reviewed.   Constitutional:       General: She is not in acute distress.     Appearance: She is well-developed.   HENT:      Head: " Normocephalic and atraumatic.   Eyes:      Conjunctiva/sclera: Conjunctivae normal.   Cardiovascular:      Rate and Rhythm: Normal rate and regular rhythm.      Heart sounds: No murmur heard.  Pulmonary:      Effort: Pulmonary effort is normal. No respiratory distress.      Breath sounds: Normal breath sounds.   Abdominal:      Palpations: Abdomen is soft.      Tenderness: There is no abdominal tenderness.   Musculoskeletal:         General: No swelling.      Cervical back: Neck supple.   Skin:     General: Skin is warm and dry.      Capillary Refill: Capillary refill takes less than 2 seconds.   Neurological:      Mental Status: She is alert.   Psychiatric:         Mood and Affect: Mood normal.

## 2024-09-16 LAB — HCV AB SER-ACNC: NEGATIVE

## 2024-10-17 ENCOUNTER — OFFICE VISIT (OUTPATIENT)
Age: 54
End: 2024-10-17
Payer: COMMERCIAL

## 2024-10-17 VITALS
OXYGEN SATURATION: 95 % | SYSTOLIC BLOOD PRESSURE: 114 MMHG | BODY MASS INDEX: 25.61 KG/M2 | HEIGHT: 62 IN | DIASTOLIC BLOOD PRESSURE: 72 MMHG | HEART RATE: 65 BPM

## 2024-10-17 DIAGNOSIS — L03.011 CELLULITIS OF FINGER OF RIGHT HAND: Primary | ICD-10-CM

## 2024-10-17 DIAGNOSIS — F41.9 ANXIETY: ICD-10-CM

## 2024-10-17 DIAGNOSIS — M79.7 FIBROMYALGIA: ICD-10-CM

## 2024-10-17 PROCEDURE — 99213 OFFICE O/P EST LOW 20 MIN: CPT | Performed by: FAMILY MEDICINE

## 2024-10-17 RX ORDER — VENLAFAXINE 37.5 MG/1
37.5 TABLET ORAL
COMMUNITY
Start: 2024-09-24 | End: 2024-12-23

## 2024-10-17 RX ORDER — CEPHALEXIN 500 MG/1
500 CAPSULE ORAL 3 TIMES DAILY
Qty: 30 CAPSULE | Refills: 0 | Status: SHIPPED | OUTPATIENT
Start: 2024-10-17 | End: 2024-10-27

## 2024-10-17 NOTE — PROGRESS NOTES
"Ambulatory Visit  Name: Seema Poe      : 1970      MRN: [id number]  Encounter Provider: SHARAD Schaffer  Encounter Date: 10/17/2024   Encounter department: [facility] PRIMARY CARE    Assessment & Plan  Cellulitis of finger of right hand    Orders:    cephalexin (KEFLEX) 500 mg capsule; Take 1 capsule (500 mg total) by mouth 3 (three) times a day for 10 days    Fibromyalgia  Continue lyrica. Patient sees Dr. Prince         Anxiety  Controlled on Wellbutrin            History of Present Illness     Patient was scrubbing and got abrasion to her right hand between the second and third finger. She noticed swelling in the area which has gotten worse. Patient  took Celebrex with some relief. Denies drainage from the area.          Review of Systems   Constitutional:  Negative for chills and fever.   HENT:  Negative for ear pain and sore throat.    Eyes:  Negative for pain and visual disturbance.   Respiratory:  Negative for cough and shortness of breath.    Cardiovascular:  Negative for chest pain and palpitations.   Gastrointestinal:  Negative for abdominal pain and vomiting.   Genitourinary:  Negative for dysuria and hematuria.   Musculoskeletal:  Positive for joint swelling (Right hand betweek second and third finger). Negative for arthralgias and back pain.   Skin:  Negative for color change and rash.   Neurological:  Negative for seizures and syncope.   All other systems reviewed and are negative.          Objective     /72   Pulse 65   Ht 5' 2\" (1.575 m)   SpO2 95%   BMI 25.61 kg/m²     Physical Exam  Vitals and nursing note reviewed.   Constitutional:       General: She is not in acute distress.     Appearance: She is well-developed.   HENT:      Head: Normocephalic and atraumatic.   Eyes:      Conjunctiva/sclera: Conjunctivae normal.   Cardiovascular:      Rate and Rhythm: Normal rate and regular rhythm.      Heart sounds: No murmur heard.  Pulmonary:      Effort: Pulmonary " effort is normal. No respiratory distress.      Breath sounds: Normal breath sounds.   Abdominal:      Palpations: Abdomen is soft.      Tenderness: There is no abdominal tenderness.   Musculoskeletal:         General: No swelling.      Cervical back: Neck supple.      Comments: Swelling and redness noted to right hand between second and third finger. No drainage noted   Skin:     General: Skin is warm and dry.      Capillary Refill: Capillary refill takes less than 2 seconds.   Neurological:      Mental Status: She is alert.   Psychiatric:         Mood and Affect: Mood normal.

## 2024-11-04 ENCOUNTER — EVALUATION (OUTPATIENT)
Age: 54
End: 2024-11-04
Payer: COMMERCIAL

## 2024-11-04 DIAGNOSIS — M54.16 LUMBAR RADICULOPATHY: Primary | ICD-10-CM

## 2024-11-04 DIAGNOSIS — Z74.09 IMPAIRED FUNCTIONAL MOBILITY, BALANCE, GAIT, AND ENDURANCE: ICD-10-CM

## 2024-11-04 PROCEDURE — 97161 PT EVAL LOW COMPLEX 20 MIN: CPT

## 2024-11-04 PROCEDURE — 97112 NEUROMUSCULAR REEDUCATION: CPT

## 2024-11-04 NOTE — PROGRESS NOTES
PT Evaluation     Today's date: 2024  Patient name: Seema Poe  : 1970  MRN: [id number]  Referring provider: Leidy Vera DO  Dx:   Encounter Diagnosis     ICD-10-CM    1. Lumbar radiculopathy  M54.16       2. Impaired functional mobility, balance, gait, and endurance  Z74.09           Start Time: 1115  Stop Time: 1215  Total time in clinic (min): 60 minutes    Assessment  Impairments: abnormal gait, abnormal or restricted ROM, activity intolerance, impaired balance, impaired physical strength, lacks appropriate home exercise program, pain with function, weight-bearing intolerance, poor posture , unable to perform ADL, participation limitations, activity limitations and endurance    Assessment details: Seema is a 54 y.o. adult who presents to outpatient physical therapy with signs and symptoms consistent with lumbar radiculopathy R >L. Primary impairments include ROM deficits, strength deficits, decreased tolerance to activity, decreased muscular endurance, impaired balance, impaired neuromuscular control, and inability to complete basic ADLs. They present with the previously stated impairments which limit their ability to perform STS transfers. floor tranfers, squat, negotiate stairs with reciprocal gait (descending>ascending), don and doff shoes and socks, and bend forward/ R side bend without modification of the activity or increased pain/ symptoms. Upon repeated motion testing pt had centralization of symptoms with extension. Upon basic postural observation, pt is a sacral sitter and has excessive lumbar lordosis. Pt was able to perform and sustain a transverse abdominal contraction for about 3 seconds. Provided pt with education regarding HEP, hydration, foot wear, sleeping position, posture, timed voiding and anatomy of condition. Provided pt with an HEP. Seema is currently functioning below their prior level of function and is a good rehab candidate who would benefit from skilled  outpatient physical therapy services to allow them to reach their goals and return to PLOF. Pt recommended for 2x a week for 6-8 weeks. Pt prognosis for therapy is guarded secondary to chronicity of the injury and complex medical history. PT discussed POC and goals with patient, patient was agreeable.      Physical therapist assistant (PTA) may be utilized to administer treatments as appropriate and in accordance with [facility] Physical [facility] Practice Act.      Goals  STG: To be completed in 4 weeks from 11/4/24:   1. Seema will report pain no worse than 6/10 at worst to indicate decreased pain level and improved tolerance to activity.   2. Seema will demonstrate gross 4/5 strength in BLE for improved stability of joint and indicate improvement in functional strength.    3. Seema will tolerate 10 minutes of continuous activity at a time with no increase in pain to indicate improved tolerance to activity.   4. Seema will demonstrate ability to sustain a strong 10 second contraction in supine with minimal verbal cueing <50% of the time from therapist.  5. Seema will be more mindful with sitting and standing posture requiring minimal verbal cueing <50% of the time from therapist to correct biomechanical alignment.   6. Seema will be independent with basic HEP to allow pt to complete exercises safely when not directly supervised during PT session.      LTG: To be completed in 6-8 weeks from 11/4/24 or upon discharge from OPPT:  1. Seema will report pain no worse than 4/10 at worst to indicate decreased pain level and improved tolerance to activity.  2. Seema will report 75% improvement in symptoms compared to start of POC to indicate functional improvement and decrease level of disability.    3. Seema will tolerate 30 minutes of consecutive activity at a time with no increase in symptoms to indicate improvement with functional mobility.   4. Seema will demonstrate gross 4+/5 strength in BLE for  improved stability of joint and indicate improvement in functional strength.    5. Seema will be independent with advanced home exercise program to allow patient to transition from physical therapy care to continue with plan of care at home without supervision from therapist and continue to progress with rehabilitation.   6. Seema will tolerate single limb stance for 10 seconds on L/R leg to allow patient to have increased stance time on L/R leg during ambulation and increase tolerance to WBing.       Plan  Patient would benefit from: PT eval and skilled physical therapy  Planned modality interventions: biofeedback, cryotherapy, thermotherapy: hydrocollator packs, ultrasound and unattended electrical stimulation    Planned therapy interventions: balance, balance/weight bearing training, flexibility, functional ROM exercises, gait training, graded exercise, graded activity, graded motor, transfer training, therapeutic exercise, therapeutic activities, stretching, strengthening, patient education, IASTM, joint mobilization, kinesiology taping, manual therapy, massage, Chase taping, neuromuscular re-education, nerve gliding, abdominal trunk stabilization, activity modification, patient/caregiver education, postural training and home exercise program    Frequency: 2x week  Duration in weeks: 8  Plan of Care beginning date: 11/4/2024  Plan of Care expiration date: 12/30/2024  Treatment plan discussed with: patient        Subjective Evaluation    History of Present Illness  Mechanism of injury: Seema is a 54 y.o. adult. They present to outpatient physical therapy with the primary complaint of low back pain and radicular symptoms into the R LE. Pt reports numbness in R calf to her toes. They are referred to OPPT by Leidy Vera DO. Seema reports the back pain started about 4 months with insidious onset/ increase in pain. Seema is only able to sit for about 45 minutes, stand for 15 minutes to an hour, and walk for  "about an hour before onset of symptoms. Pt reports that she has the most difficulty with bending forward. Pt reports difficulty with floor transfers, STS transfers, donning and doffing shoes and socks, squatting, stair negotiation (descending > ascending). Pt reports stair negotiation is harder in the AM, does complete reciprocally. Pt notes she does perform stairs with a lateral shift due to fear of falling. Pt reports she has noticed a change in her balance. Seema reports that they sleep side lying. Pt reports sleep disturbances 4x due to pain. Pt reports she losses control of her bladder at least 2x a day. Pt denies any loss of control of her bowels. Pt reports she had an injection at L5 on 10/25 and had a injection previous in July.     Patient Goals  Patient goals for therapy: decreased pain, improved balance, increased motion, increased strength, independence with ADLs/IADLs and return to work  Patient goal: \"decrease pain:, \"be able to move through ROM with less discomfort\", \"be able to negotiate stairs witout modification\", \" be able to walk for an hour\"  Pain  Current pain ratin (low back, b/l hips,  numbness/ stiffness in the R calf)  At best pain ratin (low back, b/l hips, and R calf)  At worst pain ratin (low back, b/l hips, numbness/ stiffness into the R calf)  Quality: discomfort, tight and dull ache (numbness)  Relieving factors: medications, change in position, rest and heat  Aggravating factors: sitting, standing, walking, stair climbing and lifting (bending forward, donning/ doffing socks and shoes, transfers)  Progression: no change    Social Support  Steps to enter house: yes (4 [address] with railing)  Stairs in house: yes (1 flight of stairs to the second floor, basement, and attic with a railing)   Lives in: multiple-level home  Lives with: alone (1 cat)    Employment status: not working (on leave from work)  Hand dominance: right  Exercise history: walking      Diagnostic Tests  X-ray: " abnormal (IMPRESSION: Grade 1 degenerative anterolisthesis of L4 and L5 without appreciable subluxation during flexion or extension maneuvers.)        Objective     Postural Observations  Seated posture: fair (sacral sitting)  Standing posture: fair    Additional Postural Observation Details  Excessive lumbar lordosis    Active Range of Motion     Lumbar   Flexion: Active lumbar flexion: hold water sign.  WFL and with pain  Extension: Active lumbar extension: Pt leans away from the R side.  with pain Restriction level: moderate  Left lateral flexion: Active left lumbar lateral flexion: 2 in finger tip to joint line of patella.    with pain Restriction level: moderate  Right lateral flexion: Active right lumbar lateral flexion: 1 inch finger tip to joint line of the patella.  with pain Restriction level: minimal  Left rotation:  Restriction level: minimal  Right rotation:  WFL    Additional Active Range of Motion Details  Repeated Motion Testing:    - flexion:   Starting pain/ location: 6/10 R low back, L glute, R calf numbness  Ending pain/ location: 6/10 R low back, L glute, R calf numbness, stomach pain    -extension:  Starting pain/ location: 6/10 R low back, L glute, R calf numbness, stomach pain  Ending pain/ location:      Strength/Myotome Testing     Left Hip   Planes of Motion   Flexion: 3+  Abduction: 4-  Adduction: 4-    Right Hip   Planes of Motion   Flexion: 3+  Abduction: 4-  Adduction: 4-    Left Knee   Flexion: 4-  Extension: 4-    Right Knee   Flexion: 4-  Extension: 4-    Left Ankle/Foot   Dorsiflexion: 4    Right Ankle/Foot   Dorsiflexion: 4    Muscle Activation     Additional Muscle Activation Details  Upon palpation and without pt education pt was able to sustain a transverse abdominis contraction for about 3 seconds in hook lying.    Functional Assessment        Comments  IE: 11/4/24  - 30 STS: x9 from standard height chair without use of UE support; posterior loss of balance x3; pain 6/10 in  "the low back and b/l hips  - 4 SBA: 2/4 pt was unable to  semi tandem position without loss of balance; lacked proper stepping strategy to regain balance required HHA to recover             Precautions: anterolisthesis of L4-5; fibromyalgia; anxiety      Date: 11/4            Visit #: 1 2 3 4 5 6 7 8 9 10   Manuals                                                                 Neuro Re-Ed             HEP/ EDU HEP, hydration, foot wear, sleeping position, posture, timed voiding and anatomy of condition            DB x10            DB+TAC 10x5\" holds            DB+TAC+fallouts nv            DB+TAC+marches nv            DB+TAC+90/90             prone nv            Prone on elbows nv            Open books nv            Seated ER stretch nv            Seated windmills nv            Hamstring stretch  nv                                      Ther Ex             Nustep nv            Bike             Hip ABD             Hip ADD             Leg press             Side stepping             Diagonal walking                          Ther Activity                                       Gait Training                                       Modalities                                            "

## 2024-11-07 ENCOUNTER — OFFICE VISIT (OUTPATIENT)
Age: 54
End: 2024-11-07
Payer: COMMERCIAL

## 2024-11-07 DIAGNOSIS — M54.16 LUMBAR RADICULOPATHY: Primary | ICD-10-CM

## 2024-11-07 DIAGNOSIS — Z74.09 IMPAIRED FUNCTIONAL MOBILITY, BALANCE, GAIT, AND ENDURANCE: ICD-10-CM

## 2024-11-07 PROCEDURE — 97112 NEUROMUSCULAR REEDUCATION: CPT

## 2024-11-07 PROCEDURE — 97110 THERAPEUTIC EXERCISES: CPT

## 2024-11-07 NOTE — PROGRESS NOTES
Daily Note     Today's date: 2024  Patient name: Seema Poe  : 1970  MRN: [id number]  Referring provider: Leidy Vera DO  Dx:   Encounter Diagnosis     ICD-10-CM    1. Lumbar radiculopathy  M54.16       2. Impaired functional mobility, balance, gait, and endurance  Z74.09           Start Time: 1500  Stop Time: 1545  Total time in clinic (min): 45 minutes    Subjective: Pt reports mild discomfort in her low back L> R 8/10. Pt reports compliance with HEP and denies any questions or concerns at this time. Pt reports she saw a neurologist for her L arm and has received a script for her ulnar nerve.      Objective: See treatment diary below      Assessment: Pt performed Nustep aerobic exercises to increase blood flow to the area being treated, prepare the muscles for strength training and stretching, improve overall tolerance to activity, and aerobic endurance. Pt continues to have difficulty with core activation and sustained contractions due to muscular endurance. Pt continued to require verbal cueing from the therapist for DB and core activation. Pt was able to complete session without increase in symptoms into the low back or LE. Provided pt with updated HEP and resistance bands. Pt continues to benefit from physical therapy in order to continue progressing towards goals as outlined and return to PLOF. Will continue to modify and advance current POC based on overall progress and symptom irritability.       Plan: Continue per plan of care.  Progress treatment as tolerated.       Precautions: anterolisthesis of L4-5; fibromyalgia; anxiety    Access Code: [email or web address]  URL: [email or web address]  Date: 2024  Prepared by: Roslyn Singh    Exercises  - Supine Diaphragmatic Breathing  - 2-3 x daily - 7 x weekly - 1 sets - 10 reps  - Supine Diaphragmatic Breathing  - 2-3 x daily - 7 x weekly - 1 sets - 10 reps - 5 sec  hold  - Bent Knee Fallouts  - 2-3 x daily - 7 x weekly - 1 sets - 10  "reps - 5 sec hold  - Static Prone on Elbows  - 2-3 x daily - 7 x weekly - 1 sets - 1 reps - 2- 5 minutes hold  - Sidelying Thoracic Rotation with Open Book  - 2-3 x daily - 7 x weekly - 1 sets - 10 reps - 5 sec hold      Date: 11/4 11/7           Visit #: 1 2 3 4 5 6 7 8 9 10   Manuals                                                                 Neuro Re-Ed             HEP/ EDU HEP, hydration, foot wear, sleeping position, posture, timed voiding and anatomy of condition RR           DB x10 x10           DB+TAC 10x5\" holds 10x5\" holds ea           DB+TAC+fallouts nv 10x5\" holds ea            DB+TAC+marches nv            DB+TAC+90/90             prone nv 2'           Prone on elbows nv 2'           Open books nv 10x5\" holds ea            Seated ER stretch nv            Seated windmills nv            Hamstring stretch  nv                                      Ther Ex             Nustep nv 10' L3           Bike             Hip ABD  PTB 10x10\" holds ea            Hip ADD  10x10\" holds ea            Leg press             Side stepping             Diagonal walking                          Ther Activity                                       Gait Training                                       Modalities                                            "

## 2024-11-11 ENCOUNTER — OFFICE VISIT (OUTPATIENT)
Age: 54
End: 2024-11-11
Payer: COMMERCIAL

## 2024-11-11 DIAGNOSIS — Z74.09 IMPAIRED FUNCTIONAL MOBILITY, BALANCE, GAIT, AND ENDURANCE: ICD-10-CM

## 2024-11-11 DIAGNOSIS — M54.16 LUMBAR RADICULOPATHY: Primary | ICD-10-CM

## 2024-11-11 PROCEDURE — 97112 NEUROMUSCULAR REEDUCATION: CPT

## 2024-11-11 PROCEDURE — 97110 THERAPEUTIC EXERCISES: CPT

## 2024-11-11 NOTE — PROGRESS NOTES
Daily Note     Today's date: 2024  Patient name: Seema Poe  : 1970  MRN: [id number]  Referring provider: Leidy Vera DO  Dx:   Encounter Diagnosis     ICD-10-CM    1. Lumbar radiculopathy  M54.16       2. Impaired functional mobility, balance, gait, and endurance  Z74.09           Start Time: 1530  Stop Time: 1615  Total time in clinic (min): 45 minutes    Subjective: Pt reports that she has minimal pain in the low back today, no radicular symptoms noted. Pt reports that she has been compliant with her HEP and denies any questions or concerns at this time.       Objective: See treatment diary below      Assessment: Pt performed Nustep aerobic exercises to increase blood flow to the area being treated, prepare the muscles for strength training and stretching, improve overall tolerance to activity, and aerobic endurance. Introduced resisted side stepping and diagonal walking, pt had increase in symptoms into b/l glutes. Pt has made progress with supine core activation able to maintain a 5 second hold for about 5 repetitions before noting fatigue. Following supine core activation exercises pt reported symptoms were localized to her low back. Pt continues to benefit from physical therapy in order to continue progressing towards goals as outlined and return to PLOF. Will continue to modify and advance current POC based on overall progress and symptom irritability.       Plan: Continue per plan of care.  Progress treatment as tolerated.       Precautions: anterolisthesis of L4-5; fibromyalgia; anxiety    Access Code: [email or web address]  URL: [email or web address]  Date: 2024  Prepared by: Roslyn Singh    Exercises  - Supine Diaphragmatic Breathing  - 2-3 x daily - 7 x weekly - 1 sets - 10 reps  - Supine Diaphragmatic Breathing  - 2-3 x daily - 7 x weekly - 1 sets - 10 reps - 5 sec  hold  - Bent Knee Fallouts  - 2-3 x daily - 7 x weekly - 1 sets - 10 reps - 5 sec hold  - Static Prone on  "Elbows  - 2-3 x daily - 7 x weekly - 1 sets - 1 reps - 2- 5 minutes hold  - Sidelying Thoracic Rotation with Open Book  - 2-3 x daily - 7 x weekly - 1 sets - 10 reps - 5 sec hold      Date: 11/4 11/7 11/11          Visit #: 1 2 3 4 5 6 7 8 9 10   Manuals                                                                 Neuro Re-Ed             HEP/ EDU HEP, hydration, foot wear, sleeping position, posture, timed voiding and anatomy of condition RR RR          DB x10 x10 x10          DB+TAC 10x5\" holds 10x5\" holds ea 10x5\" holds ea           DB+TAC+fallouts nv 10x5\" holds ea  10x5\" holds ea           DB+TAC+marches nv  10x5\" holds ea           DB+TAC+90/90             prone nv 2'           Prone on elbows nv 2'           Open books nv 10x5\" holds ea  10x5\" holds ea           Seated ER stretch nv  15x5\" holds ea          Seated windmills nv            Hamstring stretch  nv                                      Ther Ex             Nustep nv 10' L3 10' L5          Bike   nv          Hip ABD  PTB 10x10\" holds ea            Hip ADD  10x10\" holds ea            Leg press   Plate 4     DL 45# x20     SL 25# x20 ea           Side stepping   Yellow COB 1 lap          Diagonal walking   Yellow COB 1 lap                       Ther Activity                                       Gait Training                                       Modalities                                              "

## 2024-11-12 NOTE — PROGRESS NOTES
Daily Note     Today's date: 2024  Patient name: Seema Poe  : 1970  MRN: [id number]  Referring provider: Leidy Vera DO  Dx:   Encounter Diagnosis     ICD-10-CM    1. Lumbar radiculopathy  M54.16       2. Impaired functional mobility, balance, gait, and endurance  Z74.09           Start Time: 1100  Stop Time: 1145  Total time in clinic (min): 45 minutes    Subjective: Pt reports that she has 5/10 low back pain. Pt reports compliance with her HEP and denies any questions or concerns at this time.       Objective: See treatment diary below      Assessment: Pt performed bike aerobic exercises to increase blood flow to the area being treated, prepare the muscles for strength training and stretching, improve overall tolerance to activity, and aerobic endurance. Following warm-up pt reports that she was tight along the posterior aspect of b/l LE, improved with seated and standing stretches. Encouraged pt to perform a core activation with the leg press and walking activities to prevent overuse of the back musculature. Performed lumbar ROM/ stretching exercises at the end of the session which seemed to minimize discomfort initially noted. Provided pt with updated HEP. Pt continues to benefit from physical therapy in order to continue progressing towards goals as outlined and return to PLOF. Will continue to modify and advance current POC based on overall progress and symptom irritability.       Plan: Continue per plan of care.  Progress treatment as tolerated.       Precautions: anterolisthesis of L4-5; fibromyalgia; anxiety    Access Code: [email or web address]  URL: [email or web address]  Date: 2024  Prepared by: Roslyn Singh    Exercises  - Supine Diaphragmatic Breathing  - 2-3 x daily - 7 x weekly - 1 sets - 10 reps  - Supine Diaphragmatic Breathing  - 2-3 x daily - 7 x weekly - 1 sets - 10 reps - 5 sec  hold  - Bent Knee Fallouts  - 2-3 x daily - 7 x weekly - 1 sets - 10 reps - 5 sec  "hold  - Static Prone on Elbows  - 2-3 x daily - 7 x weekly - 1 sets - 1 reps - 2- 5 minutes hold  - Sidelying Thoracic Rotation with Open Book  - 2-3 x daily - 7 x weekly - 1 sets - 10 reps - 5 sec hold      Date: 11/4 11/7 11/11 11/13         Visit #: 1 2 3 4 5 6 7 8 9 10   Manuals                                                                 Neuro Re-Ed             HEP/ EDU HEP, hydration, foot wear, sleeping position, posture, timed voiding and anatomy of condition RR RR RR         DB x10 x10 x10          DB+TAC 10x5\" holds 10x5\" holds ea 10x5\" holds ea           DB+TAC+fallouts nv 10x5\" holds ea  10x5\" holds ea           DB+TAC+marches nv  10x5\" holds ea           DB+TAC+90/90             prone nv 2'           Prone on elbows nv 2'           Open books nv 10x5\" holds ea  10x5\" holds ea           Seated ER stretch nv  15x5\" holds ea 15x5\" holds ea          Seated windmills nv            Hamstring stretch  nv   3x30\" holds ea b/l         Piriformis stretch     3x30\" holds ea b/l         Calf stretch     2x1' at stretch board         Ball roll outs     10x5\" holds ea                                                 Ther Ex             Nustep nv 10' L3 10' L5          Bike   nv 10' L1         Hip ABD  PTB 10x10\" holds ea            Hip ADD  10x10\" holds ea            Leg press   Plate 4     DL 45# x20     SL 25# x20 ea  Plate 4     DL 55# x20     SL          Side stepping   Yellow COB 1 lap Yellow COB 1 lap         Diagonal walking   Yellow COB 1 lap Yellow COB 1 lap                      Ther Activity                                       Gait Training                                       Modalities                                                "

## 2024-11-13 ENCOUNTER — OFFICE VISIT (OUTPATIENT)
Age: 54
End: 2024-11-13
Payer: COMMERCIAL

## 2024-11-13 DIAGNOSIS — M54.16 LUMBAR RADICULOPATHY: Primary | ICD-10-CM

## 2024-11-13 DIAGNOSIS — Z74.09 IMPAIRED FUNCTIONAL MOBILITY, BALANCE, GAIT, AND ENDURANCE: ICD-10-CM

## 2024-11-13 PROCEDURE — 97110 THERAPEUTIC EXERCISES: CPT

## 2024-11-13 PROCEDURE — 97112 NEUROMUSCULAR REEDUCATION: CPT

## 2024-11-18 ENCOUNTER — OFFICE VISIT (OUTPATIENT)
Age: 54
End: 2024-11-18
Payer: COMMERCIAL

## 2024-11-18 DIAGNOSIS — Z74.09 IMPAIRED FUNCTIONAL MOBILITY, BALANCE, GAIT, AND ENDURANCE: ICD-10-CM

## 2024-11-18 DIAGNOSIS — M54.16 LUMBAR RADICULOPATHY: Primary | ICD-10-CM

## 2024-11-18 PROCEDURE — 97110 THERAPEUTIC EXERCISES: CPT

## 2024-11-18 PROCEDURE — 97112 NEUROMUSCULAR REEDUCATION: CPT

## 2024-11-18 NOTE — PROGRESS NOTES
Daily Note     Today's date: 2024  Patient name: Seema Poe  : 1970  MRN: [id number]  Referring provider: Leidy Vera DO  Dx:   Encounter Diagnosis     ICD-10-CM    1. Lumbar radiculopathy  M54.16       2. Impaired functional mobility, balance, gait, and endurance  Z74.09           Start Time: 1620  Stop Time: 1705  Total time in clinic (min): 45 minutes    Subjective: Pt reports 6/10 pain in the low back without radicular symptoms. Notes that she has been getting stomach pain when not exercising. Notes some exercises help and others are just challenging. Pt reports in general she is just very weak.      Objective: See treatment diary below      Assessment: Pt performed bike aerobic exercises to increase blood flow to the area being treated, prepare the muscles for strength training and stretching, improve overall tolerance to activity, and aerobic endurance. Pt reports that the warmup was very challenging today due to muscular fatigue, no increase in low back pain noted. Focused remainder of session around improving mobility and performing basic hip strengthening exercises. Pt had greatest difficulty with SLR and side lying hip ADD L>R. Pt left session fatigued but without increase in symptoms. Next session will provide pt with updated HEP if no adverse reactions noted after today's session. Pt continues to benefit from physical therapy in order to continue progressing towards goals as outlined and return to PLOF. Will continue to modify and advance current POC based on overall progress and symptom irritability.       Plan: Continue per plan of care.  Progress treatment as tolerated.       Precautions: anterolisthesis of L4-5; fibromyalgia; anxiety    Access Code: [email or web address]  URL: [email or web address]  Date: 2024  Prepared by: Roslyn Singh    Exercises  - Supine Diaphragmatic Breathing  - 2-3 x daily - 7 x weekly - 1 sets - 10 reps  - Supine Diaphragmatic Breathing  - 2-3 x  "daily - 7 x weekly - 1 sets - 10 reps - 5 sec  hold  - Bent Knee Fallouts  - 2-3 x daily - 7 x weekly - 1 sets - 10 reps - 5 sec hold  - Static Prone on Elbows  - 2-3 x daily - 7 x weekly - 1 sets - 1 reps - 2- 5 minutes hold  - Sidelying Thoracic Rotation with Open Book  - 2-3 x daily - 7 x weekly - 1 sets - 10 reps - 5 sec hold      Date: 11/4 11/7 11/11 11/13 11/18        Visit #: 1 2 3 4 5 6 7 8 9 10   Manuals                                                                 Neuro Re-Ed             HEP/ EDU HEP, hydration, foot wear, sleeping position, posture, timed voiding and anatomy of condition RR RR RR RR        DB x10 x10 x10          DB+TAC 10x5\" holds 10x5\" holds ea 10x5\" holds ea           DB+TAC+fallouts nv 10x5\" holds ea  10x5\" holds ea   10X10\" holds ea        DB+TAC+marches nv  10x5\" holds ea           DB+TAC+90/90             prone nv 2'           Prone on elbows nv 2'           Open books nv 10x5\" holds ea  10x5\" holds ea   10x10\" holds ea         Seated ER stretch nv  15x5\" holds ea 15x5\" holds ea  nv        Seated windmills nv    nv        Hamstring stretch  nv   3x30\" holds ea b/l 3x30\" holds ea b/l        Piriformis stretch     3x30\" holds ea b/l 3x30\" holds ea b/l        Calf stretch     2x1' at stretch board         Ball roll outs     10x5\" holds ea                                                 Ther Ex             Nustep nv 10' L3 10' L5          Bike   nv 10' L1 10' L1        Hip ABD  PTB 10x10\" holds ea            Hip ADD  10x10\" holds ea            Leg press   Plate 4     DL 45# x20     SL 25# x20 ea  Plate 4     DL 55# x20     SL  Plate 4     DL 45# x20     SL 25# x20 ea         Side stepping   Yellow COB 1 lap Yellow COB 1 lap         Diagonal walking   Yellow COB 1 lap Yellow COB 1 lap         4 way hip     X10 ea                                                Ther Activity                                       Gait Training                                       Modalities           "

## 2024-11-20 ENCOUNTER — OFFICE VISIT (OUTPATIENT)
Age: 54
End: 2024-11-20
Payer: COMMERCIAL

## 2024-11-20 DIAGNOSIS — M54.16 LUMBAR RADICULOPATHY: Primary | ICD-10-CM

## 2024-11-20 DIAGNOSIS — Z74.09 IMPAIRED FUNCTIONAL MOBILITY, BALANCE, GAIT, AND ENDURANCE: ICD-10-CM

## 2024-11-20 PROCEDURE — 97112 NEUROMUSCULAR REEDUCATION: CPT

## 2024-11-20 PROCEDURE — 97110 THERAPEUTIC EXERCISES: CPT

## 2024-11-20 NOTE — PROGRESS NOTES
Daily Note     Today's date: 2024  Patient name: Seema Poe  : 1970  MRN: [id number]  Referring provider: Leidy Vera DO  Dx:   Encounter Diagnosis     ICD-10-CM    1. Lumbar radiculopathy  M54.16       2. Impaired functional mobility, balance, gait, and endurance  Z74.09           Start Time: 1615  Stop Time: 1700  Total time in clinic (min): 45 minutes    Subjective: Pt reports that she has 4/10 pain in the low back. Notes that following exercise she has variable numbness in the front of her b/l LE and reports overall muscular fatigue. Pt reports compliance with her HEP and denies any questions or concerns at this time.      Objective: See treatment diary below      Assessment: Pt performed bike aerobic exercises to increase blood flow to the area being treated, prepare the muscles for strength training and stretching, improve overall tolerance to activity, and aerobic endurance.  Continued to focus session around foundational hip strengthening and ROM/ stretching for the low back and LE. Pt had greatest difficulty performing SLR R>L noting discomfort after about 7 repetitions. Pt left session fatigued but without an increase in pain. Provided pt with updated HEP. Pt continues to benefit from physical therapy in order to continue progressing towards goals as outlined and return to PLOF. Will continue to modify and advance current POC based on overall progress and symptom irritability.       Plan: Continue per plan of care.  Progress treatment as tolerated.       Precautions: anterolisthesis of L4-5; fibromyalgia; anxiety    Access Code: [email or web address]  URL: [email or web address]  Date: 2024  Prepared by: Roslyn Singh    Exercises  - Supine Diaphragmatic Breathing  - 2-3 x daily - 7 x weekly - 1 sets - 10 reps  - Supine Diaphragmatic Breathing  - 2-3 x daily - 7 x weekly - 1 sets - 10 reps - 5 sec  hold  - Bent Knee Fallouts  - 2-3 x daily - 7 x weekly - 1 sets - 10 reps - 5 sec  "hold  - Static Prone on Elbows  - 2-3 x daily - 7 x weekly - 1 sets - 1 reps - 2- 5 minutes hold  - Sidelying Thoracic Rotation with Open Book  - 2-3 x daily - 7 x weekly - 1 sets - 10 reps - 5 sec hold      Date: 11/4 11/7 11/11 11/13 11/18 11/20       Visit #: 1 2 3 4 5 6 7 8 9 10   Manuals                                                                 Neuro Re-Ed             HEP/ EDU HEP, hydration, foot wear, sleeping position, posture, timed voiding and anatomy of condition RR RR RR RR RR       DB x10 x10 x10          DB+TAC 10x5\" holds 10x5\" holds ea 10x5\" holds ea           DB+TAC+fallouts nv 10x5\" holds ea  10x5\" holds ea   10X10\" holds ea        DB+TAC+marches nv  10x5\" holds ea           DB+TAC+90/90             prone nv 2'           Prone on elbows nv 2'           Open books nv 10x5\" holds ea  10x5\" holds ea   10x10\" holds ea  10x10\" holds ea        Seated ER stretch nv  15x5\" holds ea 15x5\" holds ea  nv        Seated windmills nv    nv        Hamstring stretch  nv   3x30\" holds ea b/l 3x30\" holds ea b/l 3x30\" holds ea b/l       Piriformis stretch     3x30\" holds ea b/l 3x30\" holds ea b/l 3x30\" holds ea b/l       Calf stretch     2x1' at stretch board  2x1' at stretch board       Ball roll outs     10x5\" holds ea                                                 Ther Ex             Nustep nv 10' L3 10' L5          Bike   nv 10' L1 10' L1 10 L1       Hip ABD  PTB 10x10\" holds ea            Hip ADD  10x10\" holds ea            Leg press   Plate 4     DL 45# x20     SL 25# x20 ea  Plate 4     DL 55# x20     SL  Plate 4     DL 45# x20     SL 25# x20 ea  Plate 4     DL 55# x20     SL 25# x20 ea        Side stepping   Yellow COB 1 lap Yellow COB 1 lap         Diagonal walking   Yellow COB 1 lap Yellow COB 1 lap         4 way hip     X10 ea  X7 SLR > x10 Hip ABD, ADD, extension       Clam shells      x10                                 Ther Activity                                       Gait Training               "                         Modalities

## 2024-11-25 ENCOUNTER — EVALUATION (OUTPATIENT)
Age: 54
End: 2024-11-25
Payer: COMMERCIAL

## 2024-11-25 DIAGNOSIS — M25.611 IMPAIRED RANGE OF MOTION OF RIGHT SHOULDER: ICD-10-CM

## 2024-11-25 DIAGNOSIS — M25.642 DECREASED RANGE OF MOTION OF FINGER OF LEFT HAND: ICD-10-CM

## 2024-11-25 DIAGNOSIS — G56.20 ULNAR NERVE PALSY: Primary | ICD-10-CM

## 2024-11-25 DIAGNOSIS — M25.512 ACUTE PAIN OF LEFT SHOULDER: ICD-10-CM

## 2024-11-25 PROCEDURE — 97163 PT EVAL HIGH COMPLEX 45 MIN: CPT

## 2024-11-25 PROCEDURE — 97112 NEUROMUSCULAR REEDUCATION: CPT

## 2024-11-25 NOTE — LETTER
2024    Kate Gtz MD  [address]   [address]  [facility] [address]    Patient: Seema Poe   YOB: 1970   Date of Visit: 2024     Encounter Diagnosis     ICD-10-CM    1. Ulnar nerve palsy  G56.20       2. Decreased range of motion of finger of left hand  M25.642       3. Acute pain of left shoulder  M25.512           Dear Dr. Gtz:    Thank you for your recent referral of Seema Poe. Please review the attached evaluation summary from Seema's recent visit.     Please verify that you agree with the plan of care by signing the attached order.     If you have any questions or concerns, please do not hesitate to call.     I sincerely appreciate the opportunity to share in the care of one of your patients and hope to have another opportunity to work with you in the near future.       Sincerely,    Roslyn Singh, PT      Referring Provider:      I certify that I have read the below Plan of Care and certify the need for these services furnished under this plan of treatment while under my care.                    Kate Gtz MD  [address]   [address]  [facility] [address]  Via Fax: [phone]          PT Evaluation     Today's date: 2024  Patient name: Seema Poe  : 1970  MRN: [id number]  Referring provider: Kate Gtz MD  Dx:   Encounter Diagnosis     ICD-10-CM    1. Ulnar nerve palsy  G56.20       2. Decreased range of motion of finger of left hand  M25.642       3. Acute pain of left shoulder  M25.512           Start Time: 1600  Stop Time: 1700  Total time in clinic (min): 60 minutes    Assessment  Impairments: abnormal or restricted ROM, activity intolerance, impaired physical strength, lacks appropriate home exercise program, pain with function, poor posture , participation limitations and activity limitations    Assessment details: Seema is a 54 y.o. adult who presents to outpatient physical therapy with signs and symptoms  consistent with L ulnar nerve palsy. Primary impairments include ROM deficits, strength deficits, decreased tolerance to activity, decreased muscular endurance, impaired neuromuscular control, and inability to complete basic ADLs. They present with the previously stated impairments which limit their ability to , push/ pull, lift/ carry, cut her food, prepare a meal, and wash the dishes. Following movement pt is able to make a full fist with less difficulty. Provided pt with education regarding HEP, posture, sleeping position, and anatomy of condition. Provided pt with HEP. When performing tendon glides pt had a tremor bringing her ring and pinky finger into table top and full fist. Pt's pinky fatigued quickly. Seema is currently functioning below their prior level of function and is a good rehab candidate who would benefit from skilled outpatient physical therapy services to allow them to reach their goals and return to PLOF. Pt recommended for 1-2x a week for 6-8 weeks. Pt prognosis for therapy is guarded secondary to complicated medical history. PT discussed POC and goals with patient, patient was agreeable.      Physical therapist assistant (PTA) may be utilized to administer treatments as appropriate and in accordance with [facility] Physical Therapy Practice Act.      Goals  STG: to be completed in 4 weeks from 11/25/24.   1. Seema will report pain no worse than 2/10 at worst to indicate decreased pain level and improved tolerance to activity.  2. Seema will improve extension of the ring and pinky finger by 10 degrees without any increase in symptoms.  3. Seema will increase L  strength by 5-10#.  4. Seema will increase 2 finger and 3 finger pinch  by 2-4#.  5. Seema will be independent with basic HEP to allow patient to complete exercises safely when not directly supervised during PT session.    6. Seema will have increased L UE strength to 4/5.      LTG: to be completed in 6-8 weeks  from 11/25/24 or upon discharge from OPPT.   1. Seema will report no pain at worst to indicate decreased pain level and improved participation with activity.  2. Seema will be able to cut her food and prepare a meal without any increase in pain.  3. Seema will have improvement in finger and  strength/ ROM to be able to wash her dishes without increase in pain/ difficulty.  4. Seema will report 75% improvement in symptoms compared to start of POC to indicate functional improvement and decrease level of disability.    5. Seema will be independent with advanced home exercise program to allow patient to transition from physical therapy care to continuing with plan of care at home without supervision from therapist and continue to progress with rehabilitation.  6. Seema will have increased L UE strength to 4+/5.       Plan  Patient would benefit from: PT eval and skilled physical therapy  Planned modality interventions: biofeedback, cryotherapy, thermotherapy: hydrocollator packs, ultrasound and unattended electrical stimulation    Planned therapy interventions: IASTM, joint mobilization, kinesiology taping, massage, manual therapy, Chase taping, nerve gliding, neuromuscular re-education, patient education, strengthening, stretching, therapeutic activities, therapeutic exercise, transfer training, home exercise program, graded exercise, graded activity, flexibility, functional ROM exercises, patient/caregiver education, postural training, activity modification, ADL retraining, ADL training, fine motor coordination training and IADL retraining    Frequency: 1-2x per week.  Duration in weeks: 8  Plan of Care beginning date: 11/25/2024  Plan of Care expiration date: 1/20/2025  Treatment plan discussed with: patient      Subjective Evaluation    History of Present Illness  Mechanism of injury: Seema is a 54 y.o. adult. They present to outpatient physical therapy with the primary complaint of weakness in the L  "UE and tingling and numbness into digits 4-5.  They are referred to OPPT by Kate Gtz MD.  Symptoms began about 5.5 weeks ago following staining her floor and reports she had been placing pressure through her L arm. Seema initially reports significant weakness in the L UE from the bicep to the pinky and ring finger. Pt currently reports tingling and numbness into the L hand and digits 4-5. Seema reports that they sleep L side lying. Pt reports sleep disturbances 4-5x per night due to pain. Pt reports her pain is worse at night. Pt reports difficulty with gripping, pushing/ pulling. Pt notes difficulty with washing her dishes, opening jars, preparing a meal, cutting her food.     Patient Goals  Patient goal: \"have improved motion and strength in the pinky and ring finger\", \" improve  strength\", \"return to work\"  Pain  Current pain ratin (L wrist: 2/10)  At best pain ratin  At worst pain ratin  Quality: dull ache, radiating, throbbing, cramping and tight (\"tingling\", \"numb\")  Aggravating factors: lifting and overhead activity (cutting her food, preparing a meal, gripping, squeezing, pushing/ pulling)  Progression: improved    Social Support  Steps to enter house: yes (4 [address] with railing)  Interior stairs assessed: 1 flight of stairs to the second floor/ attic/ and basement with a railing.   Lives in: multiple-level home  Lives with: her mom, 1 cat, 1 dog.    Employment status: working ([employer] )  Hand dominance: right        Objective     Tenderness     Additional Tenderness Details  Pt was not tender to palpate.    Neurological Testing     Sensation     Wrist/Hand   Left   Intact: light touch    Right   Intact: light touch    Active Range of Motion   Cervical/Thoracic Spine       Cervical    Flexion:  WFL  Extension:  Restriction level: minimal  Left lateral flexion: 32 degrees     with pain  Right lateral flexion: 30 degrees     with pain  Left rotation:  WFL  Right " rotation:  WFL  Left Shoulder   Flexion: WFL  Abduction: WFL  External rotation 90°: WFL  Internal rotation 90°: 40 degrees with pain    Left Elbow   Normal active range of motion    Left Wrist   Wrist flexion: 70 degrees with pain  Wrist extension: 58 degrees with pain  Radial deviation: 35 degrees   Ulnar deviation: 28 degrees     Left Digits    Flexion   Ring     PIP: 90    DIP: 70  Little     PIP: 90    DIP: 62  Extension   Ring     PIP: -20    DIP: 0  Little     PIP: -30    DIP: 0    Passive Range of Motion     Left Digits   Extension   Ring     PIP: 0    DIP: 0  Little     PIP: 0    DIP: 0    Strength/Myotome Testing     Left Shoulder     Planes of Motion   Flexion: 4-   Abduction: 4   External rotation at 0°: 4   Internal rotation at 0°: 4     Isolated Muscles   Biceps: 5     Right Shoulder     Planes of Motion   Flexion: 4-   Abduction: 4   External rotation at 0°: 4-   Internal rotation at 0°: 4-     Isolated Muscles   Biceps: 5     Left Wrist/Hand   Normal wrist strength     (2nd hand position)     Trial 1: 40    Trial 2: 35    Trial 3: 45    Average: 40    Thumb Strength  Tip/Two-Point Pinch     Trial 1: 4    Trial 2: 2    Trial 3: 4    Average: 3.33  Palmar/Three-Point Pinch     Trial 1: 4    Trial 2: 4    Trial 3: 4    Average: 4     Right Wrist/Hand   Normal wrist strength     (2nd hand position)     Trial 1: 55    Trial 2: 60    Trial 3: 60    Average: 58.33    Thumb Strength   Tip/Two-Point Pinch     Trial 1: 12    Trial 2: 8    Trial 3: 8    Average: 9.33  Palmar/Three-Point Pinch     Trial 1: 16    Trial 2: 12    Trial 3: 14    Average: 14             Precautions: anterolisthesis of L4-5; fibromyalgia; anxiety         Date: 11/4 11/7 11/11 11/13 11/18 11/20 11/25      Visit #: 1 2 3 4 5 6 7 8 9 10   Manuals       IE for UE      PROM/ stretch of the PIP and DIP        nv      Finger blocking (PIP and DIP of digits 4-5)                                       Neuro Re-Ed             HEP/ EDU  "HEP, hydration, foot wear, sleeping position, posture, timed voiding and anatomy of condition RR RR RR RR RR RR      DB x10 x10 x10          DB+TAC 10x5\" holds 10x5\" holds ea 10x5\" holds ea           DB+TAC+fallouts nv 10x5\" holds ea  10x5\" holds ea   10X10\" holds ea        DB+TAC+marches nv  10x5\" holds ea           DB+TAC+90/90             prone nv 2'           Prone on elbows nv 2'           Open books nv 10x5\" holds ea  10x5\" holds ea   10x10\" holds ea  10x10\" holds ea        Seated ER stretch nv  15x5\" holds ea 15x5\" holds ea  nv        Seated windmills nv    nv        Hamstring stretch  nv   3x30\" holds ea b/l 3x30\" holds ea b/l 3x30\" holds ea b/l       Piriformis stretch     3x30\" holds ea b/l 3x30\" holds ea b/l 3x30\" holds ea b/l       Calf stretch     2x1' at stretch board  2x1' at stretch board       Ball roll outs     10x5\" holds ea          Ulnar nerve glide       nv      Tendon glides       X10 ea       Wrist extension/ flexion       X10 ea      UT stretch        nv      Lev scap stretch        nv      Cervical ROM       nv                   Ther Ex             Nustep nv 10' L3 10' L5          Bike   nv 10' L1 10' L1 10 L1       Hip ABD  PTB 10x10\" holds ea            Hip ADD  10x10\" holds ea            Leg press   Plate 4     DL 45# x20     SL 25# x20 ea  Plate 4     DL 55# x20     SL  Plate 4     DL 45# x20     SL 25# x20 ea  Plate 4     DL 55# x20     SL 25# x20 ea        Side stepping   Yellow COB 1 lap Yellow COB 1 lap         Diagonal walking   Yellow COB 1 lap Yellow COB 1 lap         4 way hip     X10 ea  X7 SLR > x10 Hip ABD, ADD, extension       Clam shells      x10       putty       nv      Wrist isometrics       nv      Digiflex       nv      Therabar             Clothes pins             Scap retractions       nv                   Ther Activity                                       Gait Training                                       Modalities                                                "

## 2024-11-25 NOTE — PROGRESS NOTES
PT Evaluation     Today's date: 2024  Patient name: Seema Poe  : 1970  MRN: [id number]  Referring provider: Kate Gtz MD  Dx:   Encounter Diagnosis     ICD-10-CM    1. Ulnar nerve palsy  G56.20       2. Decreased range of motion of finger of left hand  M25.642       3. Acute pain of left shoulder  M25.512       4. Impaired range of motion of right shoulder  M25.611           Start Time: 1600  Stop Time: 1700  Total time in clinic (min): 60 minutes    Assessment  Impairments: abnormal or restricted ROM, activity intolerance, impaired physical strength, lacks appropriate home exercise program, pain with function, poor posture , participation limitations and activity limitations    Assessment details: Seema is a 54 y.o. adult who presents to outpatient physical therapy with signs and symptoms consistent with L ulnar nerve palsy. Primary impairments include ROM deficits, strength deficits, decreased tolerance to activity, decreased muscular endurance, impaired neuromuscular control, and inability to complete basic ADLs. They present with the previously stated impairments which limit their ability to , push/ pull, lift/ carry, cut her food, prepare a meal, and wash the dishes. Following movement pt is able to make a full fist with less difficulty. Provided pt with education regarding HEP, posture, sleeping position, and anatomy of condition. Provided pt with HEP. When performing tendon glides pt had a tremor bringing her ring and pinky finger into table top and full fist. Pt's pinky fatigued quickly. Seema is currently functioning below their prior level of function and is a good rehab candidate who would benefit from skilled outpatient physical therapy services to allow them to reach their goals and return to PLOF. Pt recommended for 1-2x a week for 6-8 weeks. Pt prognosis for therapy is guarded secondary to complicated medical history. PT discussed POC and goals with patient, patient  was agreeable.      Physical therapist assistant (PTA) may be utilized to administer treatments as appropriate and in accordance with [facility] Physical Therapy Practice Act.      Goals  STG: to be completed in 4 weeks from 11/25/24.   1. Seema will report pain no worse than 2/10 at worst to indicate decreased pain level and improved tolerance to activity.  2. Seema will improve extension of the ring and pinky finger by 10 degrees without any increase in symptoms.  3. Seema will increase L  strength by 5-10#.  4. Seema will increase 2 finger and 3 finger pinch  by 2-4#.  5. Seema will be independent with basic HEP to allow patient to complete exercises safely when not directly supervised during PT session.    6. Seema will have increased L UE strength to 4/5.      LTG: to be completed in 6-8 weeks from 11/25/24 or upon discharge from OPPT.   1. Seema will report no pain at worst to indicate decreased pain level and improved participation with activity.  2. Seema will be able to cut her food and prepare a meal without any increase in pain.  3. Seema will have improvement in finger and  strength/ ROM to be able to wash her dishes without increase in pain/ difficulty.  4. Seema will report 75% improvement in symptoms compared to start of POC to indicate functional improvement and decrease level of disability.    5. Seema will be independent with advanced home exercise program to allow patient to transition from physical therapy care to continuing with plan of care at home without supervision from therapist and continue to progress with rehabilitation.  6. Seema will have increased L UE strength to 4+/5.       Plan  Patient would benefit from: PT eval and skilled physical therapy  Planned modality interventions: biofeedback, cryotherapy, thermotherapy: hydrocollator packs, ultrasound and unattended electrical stimulation    Planned therapy interventions: IASTM, joint mobilization,  "kinesiology taping, massage, manual therapy, Chase taping, nerve gliding, neuromuscular re-education, patient education, strengthening, stretching, therapeutic activities, therapeutic exercise, transfer training, home exercise program, graded exercise, graded activity, flexibility, functional ROM exercises, patient/caregiver education, postural training, activity modification, ADL retraining, ADL training, fine motor coordination training and IADL retraining    Frequency: 1-2x per week.  Duration in weeks: 8  Plan of Care beginning date: 2024  Plan of Care expiration date: 2025  Treatment plan discussed with: patient        Subjective Evaluation    History of Present Illness  Mechanism of injury: Seema is a 54 y.o. adult. They present to outpatient physical therapy with the primary complaint of weakness in the L UE and tingling and numbness into digits 4-5.  They are referred to OPPT by Kate Gtz MD.  Symptoms began about 5.5 weeks ago following staining her floor and reports she had been placing pressure through her L arm. Seema initially reports significant weakness in the L UE from the bicep to the pinky and ring finger. Pt currently reports tingling and numbness into the L hand and digits 4-5. Seema reports that they sleep L side lying. Pt reports sleep disturbances 4-5x per night due to pain. Pt reports her pain is worse at night. Pt reports difficulty with gripping, pushing/ pulling. Pt notes difficulty with washing her dishes, opening jars, preparing a meal, cutting her food.     Patient Goals  Patient goal: \"have improved motion and strength in the pinky and ring finger\", \" improve  strength\", \"return to work\"  Pain  Current pain ratin (L wrist: 2/10)  At best pain ratin  At worst pain ratin  Quality: dull ache, radiating, throbbing, cramping and tight (\"tingling\", \"numb\")  Aggravating factors: lifting and overhead activity (cutting her food, preparing a meal, " gripping, squeezing, pushing/ pulling)  Progression: improved    Social Support  Steps to enter house: yes (4 [address] with railing)  Interior stairs assessed: 1 flight of stairs to the second floor/ attic/ and basement with a railing.   Lives in: multiple-level home  Lives with: her mom, 1 cat, 1 dog.    Employment status: working ([employer] )  Hand dominance: right          Objective     Tenderness     Additional Tenderness Details  Pt was not tender to palpate.    Neurological Testing     Sensation     Wrist/Hand   Left   Intact: light touch    Right   Intact: light touch    Active Range of Motion   Cervical/Thoracic Spine       Cervical    Flexion:  WFL  Extension:  Restriction level: minimal  Left lateral flexion: 32 degrees     with pain  Right lateral flexion: 30 degrees     with pain  Left rotation:  WFL  Right rotation:  WFL  Left Shoulder   Flexion: WFL  Abduction: WFL  External rotation 90°: WFL  Internal rotation 90°: 40 degrees with pain    Left Elbow   Normal active range of motion    Left Wrist   Wrist flexion: 70 degrees with pain  Wrist extension: 58 degrees with pain  Radial deviation: 35 degrees   Ulnar deviation: 28 degrees     Left Digits    Flexion   Ring     PIP: 90    DIP: 70  Little     PIP: 90    DIP: 62  Extension   Ring     PIP: -20    DIP: 0  Little     PIP: -30    DIP: 0    Passive Range of Motion     Left Digits   Extension   Ring     PIP: 0    DIP: 0  Little     PIP: 0    DIP: 0    Strength/Myotome Testing     Left Shoulder     Planes of Motion   Flexion: 4-   Abduction: 4   External rotation at 0°: 4   Internal rotation at 0°: 4     Isolated Muscles   Biceps: 5     Right Shoulder     Planes of Motion   Flexion: 4-   Abduction: 4   External rotation at 0°: 4-   Internal rotation at 0°: 4-     Isolated Muscles   Biceps: 5     Left Wrist/Hand   Normal wrist strength     (2nd hand position)     Trial 1: 40    Trial 2: 35    Trial 3: 45    Average: 40    Thumb  "Strength  Tip/Two-Point Pinch     Trial 1: 4    Trial 2: 2    Trial 3: 4    Average: 3.33  Palmar/Three-Point Pinch     Trial 1: 4    Trial 2: 4    Trial 3: 4    Average: 4     Right Wrist/Hand   Normal wrist strength     (2nd hand position)     Trial 1: 55    Trial 2: 60    Trial 3: 60    Average: 58.33    Thumb Strength   Tip/Two-Point Pinch     Trial 1: 12    Trial 2: 8    Trial 3: 8    Average: 9.33  Palmar/Three-Point Pinch     Trial 1: 16    Trial 2: 12    Trial 3: 14    Average: 14             Precautions: anterolisthesis of L4-5; fibromyalgia; anxiety         Date: 11/4 11/7 11/11 11/13 11/18 11/20 11/25      Visit #: 1 2 3 4 5 6 7 8 9 10   Manuals       IE for UE      PROM/ stretch of the PIP and DIP        nv      Finger blocking (PIP and DIP of digits 4-5)                                       Neuro Re-Ed             HEP/ EDU HEP, hydration, foot wear, sleeping position, posture, timed voiding and anatomy of condition RR RR RR RR RR RR      DB x10 x10 x10          DB+TAC 10x5\" holds 10x5\" holds ea 10x5\" holds ea           DB+TAC+fallouts nv 10x5\" holds ea  10x5\" holds ea   10X10\" holds ea        DB+TAC+marches nv  10x5\" holds ea           DB+TAC+90/90             prone nv 2'           Prone on elbows nv 2'           Open books nv 10x5\" holds ea  10x5\" holds ea   10x10\" holds ea  10x10\" holds ea        Seated ER stretch nv  15x5\" holds ea 15x5\" holds ea  nv        Seated windmills nv    nv        Hamstring stretch  nv   3x30\" holds ea b/l 3x30\" holds ea b/l 3x30\" holds ea b/l       Piriformis stretch     3x30\" holds ea b/l 3x30\" holds ea b/l 3x30\" holds ea b/l       Calf stretch     2x1' at stretch board  2x1' at stretch board       Ball roll outs     10x5\" holds ea          Ulnar nerve glide       nv      Tendon glides       X10 ea       Wrist extension/ flexion       X10 ea      UT stretch        nv      Lev scap stretch        nv      Cervical ROM       nv                   Ther Ex           " "  Nustep nv 10' L3 10' L5          Bike   nv 10' L1 10' L1 10 L1       Hip ABD  PTB 10x10\" holds ea            Hip ADD  10x10\" holds ea            Leg press   Plate 4     DL 45# x20     SL 25# x20 ea  Plate 4     DL 55# x20     SL  Plate 4     DL 45# x20     SL 25# x20 ea  Plate 4     DL 55# x20     SL 25# x20 ea        Side stepping   Yellow COB 1 lap Yellow COB 1 lap         Diagonal walking   Yellow COB 1 lap Yellow COB 1 lap         4 way hip     X10 ea  X7 SLR > x10 Hip ABD, ADD, extension       Clam shells      x10       putty       nv      Wrist isometrics       nv      Digiflex       nv      Therabar             Clothes pins             Scap retractions       nv                   Ther Activity                                       Gait Training                                       Modalities                                                    "

## 2024-11-26 ENCOUNTER — APPOINTMENT (OUTPATIENT)
Age: 54
End: 2024-11-26
Payer: COMMERCIAL

## 2024-11-26 ENCOUNTER — OFFICE VISIT (OUTPATIENT)
Age: 54
End: 2024-11-26
Payer: COMMERCIAL

## 2024-11-26 DIAGNOSIS — G56.20 ULNAR NERVE PALSY: Primary | ICD-10-CM

## 2024-11-26 DIAGNOSIS — M25.611 IMPAIRED RANGE OF MOTION OF RIGHT SHOULDER: ICD-10-CM

## 2024-11-26 DIAGNOSIS — M25.642 DECREASED RANGE OF MOTION OF FINGER OF LEFT HAND: ICD-10-CM

## 2024-11-26 DIAGNOSIS — M25.512 ACUTE PAIN OF LEFT SHOULDER: ICD-10-CM

## 2024-11-26 PROCEDURE — 97140 MANUAL THERAPY 1/> REGIONS: CPT

## 2024-11-26 PROCEDURE — 97112 NEUROMUSCULAR REEDUCATION: CPT

## 2024-11-26 NOTE — PROGRESS NOTES
Daily Note     Today's date: 2024  Patient name: Seema Poe  : 1970  MRN: [id number]  Referring provider: Kate Gtz MD  Dx:   Encounter Diagnosis     ICD-10-CM    1. Ulnar nerve palsy  G56.20       2. Decreased range of motion of finger of left hand  M25.642       3. Acute pain of left shoulder  M25.512       4. Impaired range of motion of right shoulder  M25.611           Start Time: 1115  Stop Time: 1200  Total time in clinic (min): 45 minutes    Subjective: Pt reports numbness in the pinky and ring finger.      Objective: See treatment diary below      Assessment: Pt used putty to increase blood flow to the area and prepare the tissue to strengthening and ROM. Following manual work pt was able to make a full fist and extend her L pinky and ring finger, required significant focus. Pt continues to have a tremor in the L hand while performing tendon glides. Utilized cervical stretching at the end of the session to improve mobility and flexibility. Pt left session with only numbness into the fingertip of the ring and pinky finger. Noted fatigue at the end of the session. Will provide updated HEP next session if no adverse reactions noted. Pt continues to benefit from physical therapy in order to continue progressing towards goals as outlined and return to PLOF. Will continue to modify and advance current POC based on overall progress and symptom irritability.       Plan: Continue per plan of care.  Progress treatment as tolerated.       Precautions: anterolisthesis of L4-5; fibromyalgia; anxiety         Date:      Visit #: 1 2 3 4 5 6 7 8 9 10   Manuals       IE for UE      PROM/ stretch of the PIP and DIP        nv RR     Finger blocking (PIP and DIP of digits 4-5)        RR x10 ea                                Neuro Re-Ed             HEP/ EDU HEP, hydration, foot wear, sleeping position, posture, timed voiding and anatomy of condition RR RR  "RR RR RR RR RR     DB x10 x10 x10          DB+TAC 10x5\" holds 10x5\" holds ea 10x5\" holds ea           DB+TAC+fallouts nv 10x5\" holds ea  10x5\" holds ea   10X10\" holds ea        DB+TAC+marches nv  10x5\" holds ea           DB+TAC+90/90             prone nv 2'           Prone on elbows nv 2'           Open books nv 10x5\" holds ea  10x5\" holds ea   10x10\" holds ea  10x10\" holds ea        Seated ER stretch nv  15x5\" holds ea 15x5\" holds ea  nv        Seated windmills nv    nv        Hamstring stretch  nv   3x30\" holds ea b/l 3x30\" holds ea b/l 3x30\" holds ea b/l       Piriformis stretch     3x30\" holds ea b/l 3x30\" holds ea b/l 3x30\" holds ea b/l       Calf stretch     2x1' at stretch board  2x1' at stretch board       Ball roll outs     10x5\" holds ea          Ulnar nerve stretch         10x10\" holds ea      Ulnar nerve glide       nv      Tendon glides       X10 ea  X10 ea      Wrist extension/ flexion       X10 ea X10 ea      UT stretch        nv 3x30\" holds ea b/l     Lev scap stretch        nv 3x30\" holds ea b/l     Cervical ROM       nv 10x5\" holds ea                   Ther Ex             Nustep nv 10' L3 10' L5          Bike   nv 10' L1 10' L1 10 L1       Hip ABD  PTB 10x10\" holds ea            Hip ADD  10x10\" holds ea            Leg press   Plate 4     DL 45# x20     SL 25# x20 ea  Plate 4     DL 55# x20     SL  Plate 4     DL 45# x20     SL 25# x20 ea  Plate 4     DL 55# x20     SL 25# x20 ea        Side stepping   Yellow COB 1 lap Yellow COB 1 lap         Diagonal walking   Yellow COB 1 lap Yellow COB 1 lap         4 way hip     X10 ea  X7 SLR > x10 Hip ABD, ADD, extension       Clam shells      x10       putty       nv 1'      Wrist isometrics       nv nv     Digiflex       nv nv     Therabar        nv     Clothes pins        nv     Scap retractions       nv 10x5\" holds ea      shrugs        10x5\" holds ea     Ther Activity                                       Gait Training                                     "   Modalities

## 2024-12-02 ENCOUNTER — OFFICE VISIT (OUTPATIENT)
Age: 54
End: 2024-12-02
Payer: COMMERCIAL

## 2024-12-02 DIAGNOSIS — M54.16 LUMBAR RADICULOPATHY: Primary | ICD-10-CM

## 2024-12-02 DIAGNOSIS — Z74.09 IMPAIRED FUNCTIONAL MOBILITY, BALANCE, GAIT, AND ENDURANCE: ICD-10-CM

## 2024-12-02 PROCEDURE — 97110 THERAPEUTIC EXERCISES: CPT

## 2024-12-02 NOTE — PROGRESS NOTES
"Daily Note     Today's date: 2024  Patient name: Seema Poe  : 1970  MRN: [id number]  Referring provider: Leidy Vera DO  Dx:   Encounter Diagnosis     ICD-10-CM    1. Lumbar radiculopathy  M54.16       2. Impaired functional mobility, balance, gait, and endurance  Z74.09           Start Time: 1030  Stop Time: 1115  Total time in clinic (min): 45 minutes    Subjective: Pt reports that her L UE is feeling some better and she is noticing improvements with her HEP. Pt reports that she had 5/10 pain in the low back and radicular symptoms into the L LE.      Objective: See treatment diary below      Assessment: Pt performed bike aerobic exercises to increase blood flow to the area being treated, prepare the muscles for strength training and stretching, improve overall tolerance to activity, and aerobic endurance. Pt did well with session today as outlined. Pt had greatest difficulty with hip hikes L>R noting significant muscular fatigue and discomfort. Will focus next session around her ulnar nerve palsy. Pt continues to benefit from physical therapy in order to continue progressing towards goals as outlined and return to PLOF. Will continue to modify and advance current POC based on overall progress and symptom irritability.       Plan: Continue per plan of care.  Progress treatment as tolerated.       Precautions: anterolisthesis of L4-5; fibromyalgia; anxiety         Date:     Visit #: 1 2 3 4 5 6 7 8 9 10   Manuals       IE for UE      PROM/ stretch of the PIP and DIP        nv RR     Finger blocking (PIP and DIP of digits 4-5)        RR x10 ea                                Neuro Re-Ed             HEP/ EDU HEP, hydration, foot wear, sleeping position, posture, timed voiding and anatomy of condition RR RR RR RR RR RR RR RR    DB x10 x10 x10          DB+TAC 10x5\" holds 10x5\" holds ea 10x5\" holds ea           DB+TAC+fallouts nv 10x5\" holds ea  10x5\" " "holds ea   10X10\" holds ea        DB+TAC+marches nv  10x5\" holds ea           DB+TAC+90/90             prone nv 2'           Prone on elbows nv 2'           Open books nv 10x5\" holds ea  10x5\" holds ea   10x10\" holds ea  10x10\" holds ea        Seated ER stretch nv  15x5\" holds ea 15x5\" holds ea  nv        Seated windmills nv    nv        Hamstring stretch  nv   3x30\" holds ea b/l 3x30\" holds ea b/l 3x30\" holds ea b/l   3x30\" holds ea b/l    Piriformis stretch     3x30\" holds ea b/l 3x30\" holds ea b/l 3x30\" holds ea b/l   3x30\" holds ea b/l    Calf stretch     2x1' at stretch board  2x1' at stretch board       Ball roll outs     10x5\" holds ea          Ulnar nerve stretch         10x10\" holds ea      Ulnar nerve glide       nv      Tendon glides       X10 ea  X10 ea      Wrist extension/ flexion       X10 ea X10 ea      UT stretch        nv 3x30\" holds ea b/l     Lev scap stretch        nv 3x30\" holds ea b/l     Cervical ROM       nv 10x5\" holds ea      Hip hikes         10x5\" holds ea b/l                                           Ther Ex             Nustep nv 10' L3 10' L5          Bike   nv 10' L1 10' L1 10 L1   10' L1    Hip ABD  PTB 10x10\" holds ea            Hip ADD  10x10\" holds ea            Leg press   Plate 4     DL 45# x20     SL 25# x20 ea  Plate 4     DL 55# x20     SL  Plate 4     DL 45# x20     SL 25# x20 ea  Plate 4     DL 55# x20     SL 25# x20 ea    Plate 4     DL 55# x20     SL 25# x20 ea     Side stepping   Yellow COB 1 lap Yellow COB 1 lap     Yellow COB 1 lap    Diagonal walking   Yellow COB 1 lap Yellow COB 1 lap     Yellow COB 1 lap    4 way hip     X10 ea  X7 SLR > x10 Hip ABD, ADD, extension       Clam shells      x10       Step up          6\" to 12\" step x12 ea     Step downs              putty       nv 1'      Wrist isometrics       nv nv     Digiflex       nv nv     Therabar        nv     Clothes pins        nv     Scap retractions       nv 10x5\" holds ea      shrugs        10x5\" holds ea  "    Ther Activity                                       Gait Training                                       Modalities

## 2024-12-03 ENCOUNTER — OFFICE VISIT (OUTPATIENT)
Age: 54
End: 2024-12-03
Payer: COMMERCIAL

## 2024-12-03 ENCOUNTER — APPOINTMENT (OUTPATIENT)
Age: 54
End: 2024-12-03
Payer: COMMERCIAL

## 2024-12-03 ENCOUNTER — TELEPHONE (OUTPATIENT)
Dept: ADMINISTRATIVE | Facility: OTHER | Age: 54
End: 2024-12-03

## 2024-12-03 VITALS
HEART RATE: 78 BPM | SYSTOLIC BLOOD PRESSURE: 118 MMHG | DIASTOLIC BLOOD PRESSURE: 72 MMHG | BODY MASS INDEX: 27.05 KG/M2 | HEIGHT: 62 IN | OXYGEN SATURATION: 99 % | WEIGHT: 147 LBS

## 2024-12-03 DIAGNOSIS — F33.0 MILD EPISODE OF RECURRENT MAJOR DEPRESSIVE DISORDER (HCC): ICD-10-CM

## 2024-12-03 DIAGNOSIS — G89.0 CENTRAL PAIN SYNDROME: ICD-10-CM

## 2024-12-03 DIAGNOSIS — M48.061 SPINAL STENOSIS OF LUMBAR REGION, UNSPECIFIED WHETHER NEUROGENIC CLAUDICATION PRESENT: ICD-10-CM

## 2024-12-03 DIAGNOSIS — I49.3 MULTIPLE PREMATURE VENTRICULAR COMPLEXES: ICD-10-CM

## 2024-12-03 DIAGNOSIS — K21.9 GASTROESOPHAGEAL REFLUX DISEASE, UNSPECIFIED WHETHER ESOPHAGITIS PRESENT: ICD-10-CM

## 2024-12-03 DIAGNOSIS — R29.818 TRANSIENT NEUROLOGICAL SYMPTOMS: ICD-10-CM

## 2024-12-03 DIAGNOSIS — M79.7 FIBROMYALGIA: ICD-10-CM

## 2024-12-03 DIAGNOSIS — Z00.00 ANNUAL PHYSICAL EXAM: Primary | ICD-10-CM

## 2024-12-03 LAB — CK SERPL-CCNC: 102 U/L (ref 39–192)

## 2024-12-03 PROCEDURE — 36415 COLL VENOUS BLD VENIPUNCTURE: CPT

## 2024-12-03 PROCEDURE — 99396 PREV VISIT EST AGE 40-64: CPT | Performed by: FAMILY MEDICINE

## 2024-12-03 PROCEDURE — 82550 ASSAY OF CK (CPK): CPT

## 2024-12-03 PROCEDURE — 82085 ASSAY OF ALDOLASE: CPT

## 2024-12-03 PROCEDURE — 99214 OFFICE O/P EST MOD 30 MIN: CPT | Performed by: FAMILY MEDICINE

## 2024-12-03 NOTE — ASSESSMENT & PLAN NOTE
Continue cardiology follow-ups  with [facility]. Last appointment was in July

## 2024-12-03 NOTE — TELEPHONE ENCOUNTER
----- Message from Jonelle CARRILLO sent at 12/2/2024  2:02 PM EST -----  Regarding: Care Gap Request   Hepatitis C completed  12/02/24 2:02 PM    Luz Maria, our patient Seema Poe has had Hepatitis C completed/performed. Please assist in updating the patient chart by pulling the Care Everywhere (CE) document. The date of service is 9/16/24.     Thank you,  Jonelle Carreno MA  [facility]

## 2024-12-03 NOTE — PROGRESS NOTES
Adult Annual Physical  Name: Seema Poe      : 1970      MRN: [id number]  Encounter Provider: SHARAD Schaffer  Encounter Date: 12/3/2024   [facility]    Assessment & Plan  Annual physical exam         Multiple premature ventricular complexes  Continue cardiology follow-ups  with [facility]. Last appointment was in July            Gastroesophageal reflux disease, unspecified whether esophagitis present  Controlled with Omeprazole         Mild episode of recurrent major depressive disorder (HCC)  Anxiety > depression per patient report     Per review of the chart, patient has been on duloxetine and escitalopram in the past but did not tolerate either well due to side effects.  The patient is concerned that increasing her bupropion from 150 mg daily to 300 mg daily may have contributed to her recent neurologic symptoms.  Patient wishes to stay on bupropion 150 mg daily at this time.         Fibromyalgia  Continue lyrica. Patient sees Dr. Prince               Spinal stenosis of lumbar region, unspecified whether neurogenic claudication present  Sees spine specialist and rheumatologist. Continue lyrica and celebrex.             Transient neurological symptoms  Patient sees Dr. Hernandez. On noritryptylene           Immunizations and preventive care screenings were discussed with patient today. Appropriate education was printed on patient's after visit summary.    Counseling:  Exercise: the importance of regular exercise/physical activity was discussed. Recommend exercise 3-5 times per week for at least 30 minutes.          History of Present Illness     Adult Annual Physical:  Patient presents for annual physical.   Seema Poe is here for chronic conditions f/u. Patient  has to have D& C because her PAP came abnormal. She has had polyps before..     Diet and Physical Activity:  - Diet/Nutrition: well balanced diet.  - Exercise:  "walking.    General Health:  - Sleep: sleeps well.  - Hearing: normal hearing bilateral ears.  - Vision: no vision problems.  - Dental: regular dental visits.    /GYN Health:  - Follows with GYN: yes.   - Menopause: postmenopausal.   - Contraception:. Dr. Gambino Last PAP was 4 weeks ago.      Review of Systems   Constitutional:  Negative for chills and fever.   HENT:  Negative for ear pain and sore throat.    Eyes:  Negative for pain and visual disturbance.   Respiratory:  Negative for cough and shortness of breath.    Cardiovascular:  Negative for chest pain and palpitations.   Gastrointestinal:  Negative for abdominal pain and vomiting.        Acid reflux   Genitourinary:  Negative for dysuria and hematuria.   Musculoskeletal:  Positive for arthralgias, back pain and myalgias.   Skin:  Negative for color change and rash.   Neurological:  Negative for seizures and syncope.   Psychiatric/Behavioral:  Positive for dysphoric mood. The patient is nervous/anxious.    All other systems reviewed and are negative.        Objective   /72 (BP Location: Left arm, Patient Position: Sitting, Cuff Size: Standard)   Pulse 78   Ht 5' 2\" (1.575 m)   Wt 66.7 kg (147 lb)   SpO2 99%   BMI 26.89 kg/m²     Physical Exam  Vitals and nursing note reviewed.   Constitutional:       General: She is not in acute distress.     Appearance: She is well-developed.   HENT:      Head: Normocephalic and atraumatic.   Eyes:      Conjunctiva/sclera: Conjunctivae normal.   Cardiovascular:      Rate and Rhythm: Normal rate and regular rhythm.      Heart sounds: No murmur heard.  Pulmonary:      Effort: Pulmonary effort is normal. No respiratory distress.      Breath sounds: Normal breath sounds.   Abdominal:      Palpations: Abdomen is soft.      Tenderness: There is no abdominal tenderness.   Musculoskeletal:         General: Tenderness (Neck, shoulders, hips) present. No swelling.      Cervical back: Neck supple.   Skin:     General: Skin " is warm and dry.      Capillary Refill: Capillary refill takes less than 2 seconds.   Neurological:      Mental Status: She is alert.      Motor: Weakness present.   Psychiatric:         Mood and Affect: Mood normal.

## 2024-12-03 NOTE — TELEPHONE ENCOUNTER
Upon review of the In Basket request we were able to locate, review, and update the patient chart as requested for Hepatitis C .    Any additional questions or concerns should be emailed to the Practice Liaisons via the appropriate education email address, please do not reply via In Basket.    Thank you  Pastora Azevedo MA   [facility]

## 2024-12-03 NOTE — PATIENT INSTRUCTIONS
"Patient Education     Routine physical for adults   The Basics   Written by the doctors and editors at [facility]   What is a physical? -- A physical is a routine visit, or \"check-up,\" with your doctor. You might also hear it called a \"wellness visit\" or \"preventive visit.\"  During each visit, the doctor will:   Ask about your physical and mental health   Ask about your habits, behaviors, and lifestyle   Do an exam   Give you vaccines if needed   Talk to you about any medicines you take   Give advice about your health   Answer your questions  Getting regular check-ups is an important part of taking care of your health. It can help your doctor find and treat any problems you have. But it's also important for preventing health problems.  A routine physical is different from a \"sick visit.\" A sick visit is when you see a doctor because of a health concern or problem. Since physicals are scheduled ahead of time, you can think about what you want to ask the doctor.  How often should I get a physical? -- It depends on your age and health. In general, for people age 21 years and older:   If you are younger than 50 years, you might be able to get a physical every 3 years.   If you are 50 years or older, your doctor might recommend a physical every year.  If you have an ongoing health condition, like diabetes or high blood pressure, your doctor will probably want to see you more often.  What happens during a physical? -- In general, each visit will include:   Physical exam - The doctor or nurse will check your height, weight, heart rate, and blood pressure. They will also look at your eyes and ears. They will ask about how you are feeling and whether you have any symptoms that bother you.   Medicines - It's a good idea to bring a list of all the medicines you take to each doctor visit. Your doctor will talk to you about your medicines and answer any questions. Tell them if you are having any side effects that bother you. You " "should also tell them if you are having trouble paying for any of your medicines.   Habits and behaviors - This includes:   Your diet   Your exercise habits   Whether you smoke, drink alcohol, or use drugs   Whether you are sexually active   Whether you feel safe at home  Your doctor will talk to you about things you can do to improve your health and lower your risk of health problems. They will also offer help and support. For example, if you want to quit smoking, they can give you advice and might prescribe medicines. If you want to improve your diet or get more physical activity, they can help you with this, too.   Lab tests, if needed - The tests you get will depend on your age and situation. For example, your doctor might want to check your:   Cholesterol   Blood sugar   Iron level   Vaccines - The recommended vaccines will depend on your age, health, and what vaccines you already had. Vaccines are very important because they can prevent certain serious or deadly infections.   Discussion of screening - \"Screening\" means checking for diseases or other health problems before they cause symptoms. Your doctor can recommend screening based on your age, risk, and preferences. This might include tests to check for:   Cancer, such as breast, prostate, cervical, ovarian, colorectal, prostate, lung, or skin cancer   Sexually transmitted infections, such as chlamydia and gonorrhea   Mental health conditions like depression and anxiety  Your doctor will talk to you about the different types of screening tests. They can help you decide which screenings to have. They can also explain what the results might mean.   Answering questions - The physical is a good time to ask the doctor or nurse questions about your health. If needed, they can refer you to other doctors or specialists, too.  Adults older than 65 years often need other care, too. As you get older, your doctor will talk to you about:   How to prevent falling at " home   Hearing or vision tests   Memory testing   How to take your medicines safely   Making sure that you have the help and support you need at home  All topics are updated as new evidence becomes available and our peer review process is complete.  This topic retrieved from [employer] on: May 02, 2024.  Topic 542501 Version 1.0  Release: 32.4.3 - C32.122  © 2024 UpToDate, Inc[employer] and/or its affiliates. All rights reserved.  Consumer Information Use and Disclaimer   Disclaimer: This generalized information is a limited summary of diagnosis, treatment, and/or medication information. It is not meant to be comprehensive and should be used as a tool to help the user understand and/or assess potential diagnostic and treatment options. It does NOT include all information about conditions, treatments, medications, side effects, or risks that may apply to a specific patient. It is not intended to be medical advice or a substitute for the medical advice, diagnosis, or treatment of a health care provider based on the health care provider's examination and assessment of a patient's specific and unique circumstances. Patients must speak with a health care provider for complete information about their health, medical questions, and treatment options, including any risks or benefits regarding use of medications. This information does not endorse any treatments or medications as safe, effective, or approved for treating a specific patient. Up[employer], Inc[employer] and its affiliates disclaim any warranty or liability relating to this information or the use thereof.The use of this information is governed by the Terms of Use, available at https://www.woltersWeTaguwer.com/en/know/clinical-effectiveness-terms. 2024© UpToDate, Inc. and its affiliates and/or licensors. All rights reserved.  Copyright   © 2024 [employer], Inc[employer] and/or its affiliates. All rights reserved.

## 2024-12-04 ENCOUNTER — APPOINTMENT (OUTPATIENT)
Age: 54
End: 2024-12-04
Payer: COMMERCIAL

## 2024-12-04 ENCOUNTER — OFFICE VISIT (OUTPATIENT)
Age: 54
End: 2024-12-04
Payer: COMMERCIAL

## 2024-12-04 DIAGNOSIS — M25.642 DECREASED RANGE OF MOTION OF FINGER OF LEFT HAND: ICD-10-CM

## 2024-12-04 DIAGNOSIS — M25.611 IMPAIRED RANGE OF MOTION OF RIGHT SHOULDER: ICD-10-CM

## 2024-12-04 DIAGNOSIS — M25.512 ACUTE PAIN OF LEFT SHOULDER: ICD-10-CM

## 2024-12-04 DIAGNOSIS — G56.20 ULNAR NERVE PALSY: Primary | ICD-10-CM

## 2024-12-04 LAB — ALDOLASE SERPL-CCNC: 4.7 U/L (ref 3.3–10.3)

## 2024-12-04 PROCEDURE — 97110 THERAPEUTIC EXERCISES: CPT

## 2024-12-04 PROCEDURE — 97112 NEUROMUSCULAR REEDUCATION: CPT

## 2024-12-04 NOTE — PROGRESS NOTES
Daily Note     Today's date: 2024  Patient name: Seema Poe  : 1970  MRN: [id number]  Referring provider: Kate Gtz MD  Dx:   Encounter Diagnosis     ICD-10-CM    1. Ulnar nerve palsy  G56.20       2. Decreased range of motion of finger of left hand  M25.642       3. Acute pain of left shoulder  M25.512       4. Impaired range of motion of right shoulder  M25.611           Start Time: 1030  Stop Time: 1115  Total time in clinic (min): 45 minutes    Subjective: Pt reports that she has minimal to no pain today. Pt reports that she has noticed significant improvements in the L UE. Pt continues to report compliance with her HEP and denies any questions or concerns at this time.       Objective: See treatment diary below      Assessment: Pt performed UE bike aerobic exercises to increase blood flow to the area being treated, prepare the muscles for strength training and stretching, improve overall tolerance to activity, and aerobic endurance. Pt continues to do well with session as outlined without any adverse reactions. Pt fatigued appropriately with exercises as outlined. Pt demonstrated improved ring and pinky finger AROM into flexion and extension, decreased as she fatigued. Next session will return to treating the low back and LE. Pt continues to benefit from physical therapy in order to continue progressing towards goals as outlined and return to PLOF. Will continue to modify and advance current POC based on overall progress and symptom irritability.       Plan: Continue per plan of care.  Progress treatment as tolerated.       Precautions: anterolisthesis of L4-5; fibromyalgia; anxiety         Date:    Visit #: 1 2 3 4 5 6 7 8 9 10   Manuals       IE for UE      PROM/ stretch of the PIP and DIP        nv RR     Finger blocking (PIP and DIP of digits 4-5)        RR x10 ea                                Neuro Re-Ed             HEP/ EDU  "HEP, hydration, foot wear, sleeping position, posture, timed voiding and anatomy of condition RR RR RR RR RR RR RR RR RR   DB x10 x10 x10          DB+TAC 10x5\" holds 10x5\" holds ea 10x5\" holds ea           DB+TAC+fallouts nv 10x5\" holds ea  10x5\" holds ea   10X10\" holds ea        DB+TAC+marches nv  10x5\" holds ea           DB+TAC+90/90             prone nv 2'           Prone on elbows nv 2'           Open books nv 10x5\" holds ea  10x5\" holds ea   10x10\" holds ea  10x10\" holds ea        Seated ER stretch nv  15x5\" holds ea 15x5\" holds ea  nv        Seated windmills nv    nv        Hamstring stretch  nv   3x30\" holds ea b/l 3x30\" holds ea b/l 3x30\" holds ea b/l   3x30\" holds ea b/l    Piriformis stretch     3x30\" holds ea b/l 3x30\" holds ea b/l 3x30\" holds ea b/l   3x30\" holds ea b/l    Calf stretch     2x1' at stretch board  2x1' at stretch board       Ball roll outs     10x5\" holds ea          Ulnar nerve stretch         10x10\" holds ea   10x10\" holds ea    Ulnar nerve glide       nv   10x10\" holds ea    Tendon glides       X10 ea  X10 ea   X10 ea    Wrist extension/ flexion       X10 ea X10 ea   1# DB x10 ea    UT stretch        nv 3x30\" holds ea b/l     Lev scap stretch        nv 3x30\" holds ea b/l     Cervical ROM       nv 10x5\" holds ea      Hip hikes         10x5\" holds ea b/l                                           Ther Ex             Nustep nv 10' L3 10' L5          Bike   nv 10' L1 10' L1 10 L1   10' L1    UBE          1'/1' L1.2 for a total of 10'    Hip ABD  PTB 10x10\" holds ea            Hip ADD  10x10\" holds ea            Leg press   Plate 4     DL 45# x20     SL 25# x20 ea  Plate 4     DL 55# x20     SL  Plate 4     DL 45# x20     SL 25# x20 ea  Plate 4     DL 55# x20     SL 25# x20 ea    Plate 4     DL 55# x20     SL 25# x20 ea     Side stepping   Yellow COB 1 lap Yellow COB 1 lap     Yellow COB 1 lap    Diagonal walking   Yellow COB 1 lap Yellow COB 1 lap     Yellow COB 1 lap    4 way hip     X10 ea " " X7 SLR > x10 Hip ABD, ADD, extension       Clam shells      x10       Step up          6\" to 12\" step x12 ea     Step downs              putty       nv 1'      Wrist isometrics       nv nv  X10 ea fingers >    Digiflex       nv nv  Y/R    X10 ea finger    X10 fist    Therabar        nv  R x10 ea    Clothes pins        nv  Y/R x10 ea    Pincer  and 3 finger pinch    Scap retractions       nv 10x5\" holds ea   15x5\" holds ea   shrugs        10x5\" holds ea  15x5\" holds ea   Ther Activity                                       Gait Training                                       Modalities                                                        "

## 2024-12-09 ENCOUNTER — OFFICE VISIT (OUTPATIENT)
Age: 54
End: 2024-12-09
Payer: COMMERCIAL

## 2024-12-09 DIAGNOSIS — M54.16 LUMBAR RADICULOPATHY: ICD-10-CM

## 2024-12-09 DIAGNOSIS — Z74.09 IMPAIRED FUNCTIONAL MOBILITY, BALANCE, GAIT, AND ENDURANCE: ICD-10-CM

## 2024-12-09 DIAGNOSIS — M25.611 IMPAIRED RANGE OF MOTION OF RIGHT SHOULDER: ICD-10-CM

## 2024-12-09 DIAGNOSIS — G56.20 ULNAR NERVE PALSY: Primary | ICD-10-CM

## 2024-12-09 DIAGNOSIS — M25.642 DECREASED RANGE OF MOTION OF FINGER OF LEFT HAND: ICD-10-CM

## 2024-12-09 DIAGNOSIS — M25.512 ACUTE PAIN OF LEFT SHOULDER: ICD-10-CM

## 2024-12-09 PROCEDURE — 97110 THERAPEUTIC EXERCISES: CPT

## 2024-12-09 PROCEDURE — 97112 NEUROMUSCULAR REEDUCATION: CPT

## 2024-12-09 NOTE — PROGRESS NOTES
Daily Note     Today's date: 2024  Patient name: Seema Poe  : 1970  MRN: [id number]  Referring provider: Kate Gtz MD  Dx:   Encounter Diagnosis     ICD-10-CM    1. Ulnar nerve palsy  G56.20       2. Decreased range of motion of finger of left hand  M25.642       3. Acute pain of left shoulder  M25.512       4. Impaired range of motion of right shoulder  M25.611       5. Lumbar radiculopathy  M54.16       6. Impaired functional mobility, balance, gait, and endurance  Z74.09           Start Time: 1445  Stop Time: 1525  Total time in clinic (min): 40 minutes    Subjective: Pt reports that since last session she has noticed improvements in her hand, noting her ring finger is no longer numb/ tingling. Pt continues to report compliance with her HEP and denies any questions or concerns at this time. Pt reports she had a f/u appointment on 24 for her back with her neurosurgeon and is now a candidate for surgery, will reconsider this approach at her January f/u appointment. Pt reports that she may be returning to her job next week.      Objective: See treatment diary below      Assessment: Pt performed UE bike aerobic exercises to increase blood flow to the area being treated, prepare the muscles for strength training and stretching, improve overall tolerance to activity, and aerobic endurance. Pt did well with session today as outlined. Following  strength and wrist flexion and extension pt noted fatigue and onset of L wrist pain. Pt required verbal cueing and demonstration for ulnar nerve glide. Will see patient one more session then discharge to [facility] for the L UE. Pt continues to benefit from physical therapy in order to continue progressing towards goals as outlined and return to PLOF. Will continue to modify and advance current POC based on overall progress and symptom irritability.       Plan: Continue per plan of care.  Progress treatment as tolerated.       Precautions:  "anterolisthesis of L4-5; fibromyalgia; anxiety         Date: 12/9 11/18 11/20 11/25 11/26 12/2 12/4   Visit #: 11    5 6 7 8 9 10   Manuals       IE for UE      PROM/ stretch of the PIP and DIP        nv RR     Finger blocking (PIP and DIP of digits 4-5)        RR x10 ea                                Neuro Re-Ed             HEP/ EDU RR    RR RR RR RR RR RR   DB             DB+TAC             DB+TAC+fallouts     10X10\" holds ea        DB+TAC+marches             DB+TAC+90/90             prone             Prone on elbows             Open books     10x10\" holds ea  10x10\" holds ea        Seated ER stretch     nv        Seated windmills     nv        Hamstring stretch      3x30\" holds ea b/l 3x30\" holds ea b/l   3x30\" holds ea b/l    Piriformis stretch      3x30\" holds ea b/l 3x30\" holds ea b/l   3x30\" holds ea b/l    Calf stretch       2x1' at stretch board       Ball roll outs              Ulnar nerve stretch         10x10\" holds ea   10x10\" holds ea    Ulnar nerve glide 15x5\" holds ea       nv   10x10\" holds ea    Tendon glides X15 ea       X10 ea  X10 ea   X10 ea    Wrist extension/ flexion 1# DB x10 ea       X10 ea X10 ea   1# DB x10 ea    UT stretch        nv 3x30\" holds ea b/l     Lev scap stretch        nv 3x30\" holds ea b/l     Cervical ROM       nv 10x5\" holds ea      Hip hikes         10x5\" holds ea b/l                                           Ther Ex             Nustep             Bike     10' L1 10 L1   10' L1    UBE 1'/1' L1.6         1'/1' L1.2 for a total of 10'    Hip ABD             Hip ADD             Leg press     Plate 4     DL 45# x20     SL 25# x20 ea  Plate 4     DL 55# x20     SL 25# x20 ea    Plate 4     DL 55# x20     SL 25# x20 ea     Side stepping         Yellow COB 1 lap    Diagonal walking         Yellow COB 1 lap    4 way hip     X10 ea  X7 SLR > x10 Hip ABD, ADD, extension       Clam shells      x10       Step up          6\" to 12\" step x12 ea     Step downs              putty       " "nv 1'      Wrist isometrics X12 ea fingers > open fist > closed fist       nv nv  X10 ea fingers >    Digiflex Y/R    X12 ea finger    X12 fist       nv nv  Y/R    X10 ea finger    X10 fist    Therabar R x15 ea        nv  R x10 ea    Clothes pins Y/R x12 ea    Pincer  and 3 finger pinch        nv  Y/R x10 ea    Pincer  and 3 finger pinch    Scap retractions 15x5\" holds ea      nv 10x5\" holds ea   15x5\" holds ea   shrugs 15x5\" holds ea 3# DB       10x5\" holds ea  15x5\" holds ea   Ther Activity                                       Gait Training                                       Modalities                                                          "

## 2024-12-11 ENCOUNTER — OFFICE VISIT (OUTPATIENT)
Age: 54
End: 2024-12-11
Payer: COMMERCIAL

## 2024-12-11 DIAGNOSIS — M54.16 LUMBAR RADICULOPATHY: Primary | ICD-10-CM

## 2024-12-11 DIAGNOSIS — Z74.09 IMPAIRED FUNCTIONAL MOBILITY, BALANCE, GAIT, AND ENDURANCE: ICD-10-CM

## 2024-12-11 PROCEDURE — 97110 THERAPEUTIC EXERCISES: CPT

## 2024-12-11 PROCEDURE — 97112 NEUROMUSCULAR REEDUCATION: CPT

## 2024-12-11 NOTE — PROGRESS NOTES
"Daily Note     Today's date: 2024  Patient name: Seema Poe  : 1970  MRN: [id number]  Referring provider: Leidy Vera DO  Dx:   Encounter Diagnosis     ICD-10-CM    1. Lumbar radiculopathy  M54.16       2. Impaired functional mobility, balance, gait, and endurance  Z74.09           Start Time: 1445  Stop Time: 1530  Total time in clinic (min): 45 minutes    Subjective: Pt reports 4/10 pain in the low back today with minimal radicular symptoms. Pt continues to report compliance with her HEP and denies any questions or concerns at this time.      Objective: See treatment diary below      Assessment: Pt performed bike aerobic exercises to increase blood flow to the area being treated, prepare the muscles for strength training and stretching, improve overall tolerance to activity, and aerobic endurance. Pt did well with session today without any increase in pain but did note musculare soreness and fatigue. Pt had greatest difficulty with 3 way hip exercise fatigue quickly on the standing leg, L >R. Throughout session pt required verbal cueing from therapist to engage her core and correct posture with standing activities. Pt continues to benefit from physical therapy in order to continue progressing towards goals as outlined and return to PLOF. Will continue to modify and advance current POC based on overall progress and symptom irritability.       Plan: Continue per plan of care.  Progress treatment as tolerated.       Precautions: anterolisthesis of L4-5; fibromyalgia; anxiety         Date:    Visit #: 11 12   5 6 7 8 9 10   Manuals       IE for UE      PROM/ stretch of the PIP and DIP        nv RR     Finger blocking (PIP and DIP of digits 4-5)        RR x10 ea                                Neuro Re-Ed             HEP/ EDU RR RR   RR RR RR RR RR RR   DB             DB+TAC             DB+TAC+fallouts     10X10\" holds ea        DB+TAC+           " "  DB+TAC+90/90             prone             Prone on elbows             Open books     10x10\" holds ea  10x10\" holds ea        Seated ER stretch     nv        Seated windmills     nv        Hamstring stretch   3x30\" holds ea b/l   3x30\" holds ea b/l 3x30\" holds ea b/l   3x30\" holds ea b/l    Piriformis stretch   3x30\" holds ea b/l   3x30\" holds ea b/l 3x30\" holds ea b/l   3x30\" holds ea b/l    Calf stretch       2x1' at stretch board       Ball roll outs   Blue PB 10x5\" holds ea           Ulnar nerve stretch         10x10\" holds ea   10x10\" holds ea    Ulnar nerve glide 15x5\" holds ea       nv   10x10\" holds ea    Tendon glides X15 ea       X10 ea  X10 ea   X10 ea    Wrist extension/ flexion 1# DB x10 ea       X10 ea X10 ea   1# DB x10 ea    UT stretch        nv 3x30\" holds ea b/l     Lev scap stretch        nv 3x30\" holds ea b/l     Cervical ROM       nv 10x5\" holds ea      Hip hikes         10x5\" holds ea b/l                                           Ther Ex             Nustep             Bike  10' L2   10' L1 10 L1   10' L1    UBE 1'/1' L1.6         1'/1' L1.2 for a total of 10'    Hip ABD             Hip ADD             Leg press  Plate 4   DL 55# x20     SL 25# x20 ea       Plate 4     DL 45# x20     SL 25# x20 ea  Plate 4     DL 55# x20     SL 25# x20 ea    Plate 4     DL 55# x20     SL 25# x20 ea     Side stepping         Yellow COB 1 lap    Diagonal walking         Yellow COB 1 lap    4 way hip     X10 ea  X7 SLR > x10 Hip ABD, ADD, extension       Clam shells      x10       Step up          6\" to 12\" step x12 ea     Step downs              Sled push/pulls  Sled only 2 laps ea            3 way hip  X10 ea 1 HHA           putty       nv 1'      Wrist isometrics X12 ea fingers > open fist > closed fist       nv nv  X10 ea fingers >    Digiflex Y/R    X12 ea finger    X12 fist       nv nv  Y/R    X10 ea finger    X10 fist    Therabar R x15 ea        nv  R x10 ea    Clothes pins Y/R x12 ea    Pincer  and 3 " "finger pinch        nv  Y/R x10 ea    Pincer  and 3 finger pinch    Scap retractions 15x5\" holds ea      nv 10x5\" holds ea   15x5\" holds ea   shrugs 15x5\" holds ea 3# DB       10x5\" holds ea  15x5\" holds ea   Ther Activity                                       Gait Training                                       Modalities                                                            "

## 2024-12-16 ENCOUNTER — APPOINTMENT (OUTPATIENT)
Age: 54
End: 2024-12-16
Payer: COMMERCIAL

## 2024-12-17 ENCOUNTER — OFFICE VISIT (OUTPATIENT)
Age: 54
End: 2024-12-17
Payer: COMMERCIAL

## 2024-12-17 DIAGNOSIS — M25.611 IMPAIRED RANGE OF MOTION OF RIGHT SHOULDER: ICD-10-CM

## 2024-12-17 DIAGNOSIS — M25.512 ACUTE PAIN OF LEFT SHOULDER: ICD-10-CM

## 2024-12-17 DIAGNOSIS — G56.20 ULNAR NERVE PALSY: Primary | ICD-10-CM

## 2024-12-17 DIAGNOSIS — M25.642 DECREASED RANGE OF MOTION OF FINGER OF LEFT HAND: ICD-10-CM

## 2024-12-17 PROCEDURE — 97110 THERAPEUTIC EXERCISES: CPT

## 2024-12-17 PROCEDURE — 97112 NEUROMUSCULAR REEDUCATION: CPT

## 2024-12-17 PROCEDURE — 97164 PT RE-EVAL EST PLAN CARE: CPT

## 2024-12-17 NOTE — PROGRESS NOTES
PT Re-Evaluation  and PT Discharge    Today's date: 2024  Patient name: Seema Poe  : 1970  MRN: [id number]  Referring provider: Kate Gtz MD  Dx:   Encounter Diagnosis     ICD-10-CM    1. Ulnar nerve palsy  G56.20       2. Decreased range of motion of finger of left hand  M25.642       3. Acute pain of left shoulder  M25.512       4. Impaired range of motion of right shoulder  M25.611             Start Time: 0915  Stop Time: 1000  Total time in clinic (min): 45 minutes    Assessment  Impairments: abnormal or restricted ROM, impaired physical strength and pain with function    Assessment details: Seema is a 54 y.o. adult who initially presented to outpatient physical therapy with signs and symptoms consistent with L ulnar nerve palsy. Since starting OPPT, pt has had significant improvements in L UE including ROM and strength. Pt only has mild difficulty with gripping, OH activity, and opening a jar. Pt was able to make a full fist and was able to achieve full ROM at the DIP/ PIP of the 4-5 digits.  > pinch strength improvements noted. Based on goals met and overall progress, pt is appropriate for discharge to an [facility]. Discussed discharge plan with pt, pt was agreeable to discharge plan.     Goals  STG: to be completed in 4 weeks from 24.   1. Seema will report pain no worse than 2/10 at worst to indicate decreased pain level and improved tolerance to activity. (In progress)  2. Seema will improve extension of the ring and pinky finger by 10 degrees without any increase in symptoms. (MET)  3. Seema will increase L  strength by 5-10#. (MET)  4. Seema will increase 2 finger and 3 finger pinch  by 2-4#. (MET)  5. Seema will be independent with basic HEP to allow patient to complete exercises safely when not directly supervised during PT session.  (MET)  6. Seema will have increased L UE strength to 4/5. (MET)      LTG: to be completed in 6-8 weeks from 24 or upon  "discharge from OPPT.   1. Seema will report no pain at worst to indicate decreased pain level and improved participation with activity. (In progress)  2. Seema will be able to cut her food and prepare a meal without any increase in pain. (MET)  3. Seema will have improvement in finger and  strength/ ROM to be able to wash her dishes without increase in pain/ difficulty. (MET)  4. Seema will report 75% improvement in symptoms compared to start of POC to indicate functional improvement and decrease level of disability.  (MET)  5. Seema will be independent with advanced home exercise program to allow patient to transition from physical therapy care to continuing with plan of care at home without supervision from therapist and continue to progress with rehabilitation. (MET)  6. Seema will have increased L UE strength to 4+/5. (In progress)       Plan  Therapy options: discharge to [facility].    Plan of Care beginning date: 2024  Plan of Care expiration date: 2024  Treatment plan discussed with: patient        Subjective Evaluation    History of Present Illness  Mechanism of injury: Since starting OPPT pt reports she is roughly 80% improved. Pt reports she has regained pain her motion of her L UE and noticed improvements with strength. Pt reports that she newsome have mild numbness into the pinky and ring finger, however significantly improved. Pt is now returning to work on 24. Pt reports decreased frequency in sleep disturbances but is unsure if she is waking due to pain. Pt only reports mild difficulty with gripping and opening jars.     Patient Goals  Patient goal: \"have improved motion and strength in the pinky and ring finger\"(mostly met), \" improve  strength\"(mostly met), \"return to work\"(met)  Pain  Current pain ratin  At best pain ratin  At worst pain rating: 3 (L wrist)  Quality: \"tingling\", \"numb\"  Aggravating factors: overhead activity (cutting her food, gripping, " squeezing)  Progression: improved    Social Support  Steps to enter house: yes (4 [address] with railing)  Interior stairs assessed: 1 flight of stairs to the second floor/ attic/ and basement with a railing.   Lives in: multiple-level home  Lives with: her mom, 1 cat, 1 dog.    Employment status: working ([employer] )  Hand dominance: right          Objective     Tenderness     Additional Tenderness Details  Pt was not tender to palpate.    Neurological Testing     Sensation     Wrist/Hand   Left   Intact: light touch    Right   Intact: light touch    Active Range of Motion   Cervical/Thoracic Spine       Cervical    Flexion:  WFL  Extension:  Restriction level: minimal  Left lateral flexion: 38 (stretch in the R UT) degrees      Right lateral flexion: 31 (Stretch in the L UT) degrees      Left rotation:  WFL  Right rotation:  WFL  Left Shoulder   Normal active range of motion    Left Elbow   Normal active range of motion    Left Wrist   Wrist flexion: 70 degrees   Wrist extension: WFL  Radial deviation: 35 degrees   Ulnar deviation: 35 degrees     Left Digits    Flexion   Ring     PIP: 90    DIP: 90  Little     PIP: 90    DIP: 90  Extension   Ring     PIP: 0    DIP: 0  Little     PIP: 0    DIP: 0    Strength/Myotome Testing     Left Shoulder     Planes of Motion   Flexion: 4   Abduction: 4 (pain in the elbow with MMT)   External rotation at 0°: 4+   Internal rotation at 0°: 4+     Isolated Muscles   Biceps: 5     Right Shoulder     Planes of Motion   Flexion: 4   Abduction: 4   External rotation at 0°: 4+   Internal rotation at 0°: 4+     Isolated Muscles   Biceps: 5     Left Wrist/Hand   Normal wrist strength     (2nd hand position)     Trial 1: 60    Trial 2: 55    Trial 3: 55    Average: 56.67    Thumb Strength  Tip/Two-Point Pinch     Trial 1: 8    Trial 2: 6    Trial 3: 6    Average: 6.67  Palmar/Three-Point Pinch     Trial 1: 10    Trial 2: 6    Trial 3: 6    Average: 7.33     Right Wrist/Hand  "  Normal wrist strength     (2nd hand position)     Trial 1: 70    Trial 2: 65    Trial 3: 65    Average: 66.67    Thumb Strength   Tip/Two-Point Pinch     Trial 1: 14    Trial 2: 12    Trial 3: 12    Average: 12.67  Palmar/Three-Point Pinch     Trial 1: 18    Trial 2: 18    Trial 3: 20    Average: 18.67             Precautions: anterolisthesis of L4-5; fibromyalgia; anxiety         Date: 12/9 12/11 12/17 11/18 11/20 11/25 11/26 12/2 12/4   Visit #: 11 12 13  5 6 7 8 9 10   Manuals       IE for UE      PROM/ stretch of the PIP and DIP        nv RR     Finger blocking (PIP and DIP of digits 4-5)        RR x10 ea                                Neuro Re-Ed             HEP/ EDU RR RR RR  RR RR RR RR RR RR   DB             DB+TAC             DB+TAC+fallouts     10X10\" holds ea        DB+TAC+marches             DB+TAC+90/90             prone             Prone on elbows             Open books     10x10\" holds ea  10x10\" holds ea        Seated ER stretch     nv        Seated windmills     nv        Hamstring stretch   3x30\" holds ea b/l   3x30\" holds ea b/l 3x30\" holds ea b/l   3x30\" holds ea b/l    Piriformis stretch   3x30\" holds ea b/l   3x30\" holds ea b/l 3x30\" holds ea b/l   3x30\" holds ea b/l    Calf stretch       2x1' at stretch board       Ball roll outs   Blue PB 10x5\" holds ea           Ulnar nerve stretch         10x10\" holds ea   10x10\" holds ea    Ulnar nerve glide 15x5\" holds ea       nv   10x10\" holds ea    Tendon glides X15 ea       X10 ea  X10 ea   X10 ea    Wrist extension/ flexion 1# DB x10 ea       X10 ea X10 ea   1# DB x10 ea    UT stretch    3x30\" holds ea b/l    nv 3x30\" holds ea b/l     Lev scap stretch    3x30\" holds ea b/l    nv 3x30\" holds ea b/l     Cervical ROM   10x5\"holds ea     nv 10x5\" holds ea      Hip hikes         10x5\" holds ea b/l                                           Ther Ex             Nustep             Bike  10' L2   10' L1 10 L1   10' L1    UBE 1'/1' L1.6  5'/5' L1.6       " "1'/1' L1.2 for a total of 10'    Hip ABD             Hip ADD             Leg press  Plate 4   DL 55# x20     SL 25# x20 ea       Plate 4     DL 45# x20     SL 25# x20 ea  Plate 4     DL 55# x20     SL 25# x20 ea    Plate 4     DL 55# x20     SL 25# x20 ea     Side stepping         Yellow COB 1 lap    Diagonal walking         Yellow COB 1 lap    4 way hip     X10 ea  X7 SLR > x10 Hip ABD, ADD, extension       Clam shells      x10       Step up          6\" to 12\" step x12 ea     Step downs              Sled push/pulls  Sled only 2 laps ea            3 way hip  X10 ea 1 HHA           putty       nv 1'      Wrist isometrics X12 ea fingers > open fist > closed fist       nv nv  X10 ea fingers >    Digiflex Y/R    X12 ea finger    X12 fist       nv nv  Y/R    X10 ea finger    X10 fist    Therabar R x15 ea        nv  R x10 ea    Clothes pins Y/R x12 ea    Pincer  and 3 finger pinch        nv  Y/R x10 ea    Pincer  and 3 finger pinch    Scap retractions 15x5\" holds ea      nv 10x5\" holds ea   15x5\" holds ea   shrugs 15x5\" holds ea 3# DB       10x5\" holds ea  15x5\" holds ea   Ther Activity                                       Gait Training                                       Modalities                                                "

## 2024-12-17 NOTE — LETTER
2024    Kate Gtz MD  [address]   [address]  [facility] [address]    Patient: Seema Poe   YOB: 1970   Date of Visit: 2024     Encounter Diagnosis     ICD-10-CM    1. Ulnar nerve palsy  G56.20       2. Decreased range of motion of finger of left hand  M25.642       3. Acute pain of left shoulder  M25.512       4. Impaired range of motion of right shoulder  M25.611           Dear Dr. Gtz:    Thank you for your recent referral of Seema Poe. Please review the attached evaluation summary from Seema's recent visit.     Please verify that you agree with the plan of care by signing the attached order.     If you have any questions or concerns, please do not hesitate to call.     I sincerely appreciate the opportunity to share in the care of one of your patients and hope to have another opportunity to work with you in the near future.       Sincerely,    Roslyn Singh, PT      Referring Provider:      I certify that I have read the below Plan of Care and certify the need for these services furnished under this plan of treatment while under my care.                    Kate Gtz MD  [address]   [address]  [facility] [address]  Via Fax: [phone]          PT Re-Evaluation  and PT Discharge    Today's date: 2024  Patient name: Seema Poe  : 1970  MRN: [id number]  Referring provider: Kate Gtz MD  Dx:   Encounter Diagnosis     ICD-10-CM    1. Ulnar nerve palsy  G56.20       2. Decreased range of motion of finger of left hand  M25.642       3. Acute pain of left shoulder  M25.512       4. Impaired range of motion of right shoulder  M25.611             Start Time: 0915  Stop Time: 1000  Total time in clinic (min): 45 minutes    Assessment  Impairments: abnormal or restricted ROM, impaired physical strength and pain with function    Assessment details: Seema is a 54 y.o. adult who initially presented to outpatient physical therapy  with signs and symptoms consistent with L ulnar nerve palsy. Since starting OPPT, pt has had significant improvements in L UE including ROM and strength. Pt only has mild difficulty with gripping, OH activity, and opening a jar. Pt was able to make a full fist and was able to achieve full ROM at the DIP/ PIP of the 4-5 digits.  > pinch strength improvements noted. Based on goals met and overall progress, pt is appropriate for discharge to an [facility]. Discussed discharge plan with pt, pt was agreeable to discharge plan.     Goals  STG: to be completed in 4 weeks from 11/25/24.   1. Seema will report pain no worse than 2/10 at worst to indicate decreased pain level and improved tolerance to activity. (In progress)  2. Seema will improve extension of the ring and pinky finger by 10 degrees without any increase in symptoms. (MET)  3. Seema will increase L  strength by 5-10#. (MET)  4. Seema will increase 2 finger and 3 finger pinch  by 2-4#. (MET)  5. Seema will be independent with basic HEP to allow patient to complete exercises safely when not directly supervised during PT session.  (MET)  6. Seema will have increased L UE strength to 4/5. (MET)      LTG: to be completed in 6-8 weeks from 11/25/24 or upon discharge from OPPT.   1. Seema will report no pain at worst to indicate decreased pain level and improved participation with activity. (In progress)  2. Seema will be able to cut her food and prepare a meal without any increase in pain. (MET)  3. Seema will have improvement in finger and  strength/ ROM to be able to wash her dishes without increase in pain/ difficulty. (MET)  4. Seema will report 75% improvement in symptoms compared to start of POC to indicate functional improvement and decrease level of disability.  (MET)  5. Seema will be independent with advanced home exercise program to allow patient to transition from physical therapy care to continuing with plan of care at home without  "supervision from therapist and continue to progress with rehabilitation. (MET)  6. Seema will have increased L UE strength to 4+/5. (In progress)       Plan  Therapy options: discharge to [facility].    Plan of Care beginning date: 2024  Plan of Care expiration date: 2024  Treatment plan discussed with: patient        Subjective Evaluation    History of Present Illness  Mechanism of injury: Since starting OPPT pt reports she is roughly 80% improved. Pt reports she has regained pain her motion of her L UE and noticed improvements with strength. Pt reports that she newsome have mild numbness into the pinky and ring finger, however significantly improved. Pt is now returning to work on 24. Pt reports decreased frequency in sleep disturbances but is unsure if she is waking due to pain. Pt only reports mild difficulty with gripping and opening jars.     Patient Goals  Patient goal: \"have improved motion and strength in the pinky and ring finger\"(mostly met), \" improve  strength\"(mostly met), \"return to work\"(met)  Pain  Current pain ratin  At best pain ratin  At worst pain rating: 3 (L wrist)  Quality: \"tingling\", \"numb\"  Aggravating factors: overhead activity (cutting her food, gripping, squeezing)  Progression: improved    Social Support  Steps to enter house: yes (4 [address] with railing)  Interior stairs assessed: 1 flight of stairs to the second floor/ attic/ and basement with a railing.   Lives in: multiple-level home  Lives with: her mom, 1 cat, 1 dog.    Employment status: working ([employer] )  Hand dominance: right          Objective     Tenderness     Additional Tenderness Details  Pt was not tender to palpate.    Neurological Testing     Sensation     Wrist/Hand   Left   Intact: light touch    Right   Intact: light touch    Active Range of Motion   Cervical/Thoracic Spine       Cervical    Flexion:  WFL  Extension:  Restriction level: minimal  Left lateral flexion: 38 (stretch " in the R UT) degrees      Right lateral flexion: 31 (Stretch in the L UT) degrees      Left rotation:  WFL  Right rotation:  WFL  Left Shoulder   Normal active range of motion    Left Elbow   Normal active range of motion    Left Wrist   Wrist flexion: 70 degrees   Wrist extension: WFL  Radial deviation: 35 degrees   Ulnar deviation: 35 degrees     Left Digits    Flexion   Ring     PIP: 90    DIP: 90  Little     PIP: 90    DIP: 90  Extension   Ring     PIP: 0    DIP: 0  Little     PIP: 0    DIP: 0    Strength/Myotome Testing     Left Shoulder     Planes of Motion   Flexion: 4   Abduction: 4 (pain in the elbow with MMT)   External rotation at 0°: 4+   Internal rotation at 0°: 4+     Isolated Muscles   Biceps: 5     Right Shoulder     Planes of Motion   Flexion: 4   Abduction: 4   External rotation at 0°: 4+   Internal rotation at 0°: 4+     Isolated Muscles   Biceps: 5     Left Wrist/Hand   Normal wrist strength     (2nd hand position)     Trial 1: 60    Trial 2: 55    Trial 3: 55    Average: 56.67    Thumb Strength  Tip/Two-Point Pinch     Trial 1: 8    Trial 2: 6    Trial 3: 6    Average: 6.67  Palmar/Three-Point Pinch     Trial 1: 10    Trial 2: 6    Trial 3: 6    Average: 7.33     Right Wrist/Hand   Normal wrist strength     (2nd hand position)     Trial 1: 70    Trial 2: 65    Trial 3: 65    Average: 66.67    Thumb Strength   Tip/Two-Point Pinch     Trial 1: 14    Trial 2: 12    Trial 3: 12    Average: 12.67  Palmar/Three-Point Pinch     Trial 1: 18    Trial 2: 18    Trial 3: 20    Average: 18.67             Precautions: anterolisthesis of L4-5; fibromyalgia; anxiety         Date: 12/9 12/11 12/17 11/18 11/20 11/25 11/26 12/2 12/4   Visit #: 11 12 13  5 6 7 8 9 10   Manuals       IE for UE      PROM/ stretch of the PIP and DIP        nv RR     Finger blocking (PIP and DIP of digits 4-5)        RR x10 ea                                Neuro Re-Ed             HEP/ EDU RR RR RR  RR RR RR RR RR RR   DB       "       DB+TAC             DB+TAC+fallouts     10X10\" holds ea        DB+TAC+marches             DB+TAC+90/90             prone             Prone on elbows             Open books     10x10\" holds ea  10x10\" holds ea        Seated ER stretch     nv        Seated windmills     nv        Hamstring stretch   3x30\" holds ea b/l   3x30\" holds ea b/l 3x30\" holds ea b/l   3x30\" holds ea b/l    Piriformis stretch   3x30\" holds ea b/l   3x30\" holds ea b/l 3x30\" holds ea b/l   3x30\" holds ea b/l    Calf stretch       2x1' at stretch board       Ball roll outs   Blue PB 10x5\" holds ea           Ulnar nerve stretch         10x10\" holds ea   10x10\" holds ea    Ulnar nerve glide 15x5\" holds ea       nv   10x10\" holds ea    Tendon glides X15 ea       X10 ea  X10 ea   X10 ea    Wrist extension/ flexion 1# DB x10 ea       X10 ea X10 ea   1# DB x10 ea    UT stretch    3x30\" holds ea b/l    nv 3x30\" holds ea b/l     Lev scap stretch    3x30\" holds ea b/l    nv 3x30\" holds ea b/l     Cervical ROM   10x5\"holds ea     nv 10x5\" holds ea      Hip hikes         10x5\" holds ea b/l                                           Ther Ex             Nustep             Bike  10' L2   10' L1 10 L1   10' L1    UBE 1'/1' L1.6  5'/5' L1.6       1'/1' L1.2 for a total of 10'    Hip ABD             Hip ADD             Leg press  Plate 4   DL 55# x20     SL 25# x20 ea       Plate 4     DL 45# x20     SL 25# x20 ea  Plate 4     DL 55# x20     SL 25# x20 ea    Plate 4     DL 55# x20     SL 25# x20 ea     Side stepping         Yellow COB 1 lap    Diagonal walking         Yellow COB 1 lap    4 way hip     X10 ea  X7 SLR > x10 Hip ABD, ADD, extension       Clam shells      x10       Step up          6\" to 12\" step x12 ea     Step downs              Sled push/pulls  Sled only 2 laps ea            3 way hip  X10 ea 1 HHA           putty       nv 1'      Wrist isometrics X12 ea fingers > open fist > closed fist       nv nv  X10 ea fingers >    Digiflex Y/R    X12 ea " "finger    X12 fist       nv nv  Y/R    X10 ea finger    X10 fist    Therabar R x15 ea        nv  R x10 ea    Clothes pins Y/R x12 ea    Pincer  and 3 finger pinch        nv  Y/R x10 ea    Pincer  and 3 finger pinch    Scap retractions 15x5\" holds ea      nv 10x5\" holds ea   15x5\" holds ea   shrugs 15x5\" holds ea 3# DB       10x5\" holds ea  15x5\" holds ea   Ther Activity                                       Gait Training                                       Modalities                                                                "

## 2024-12-18 ENCOUNTER — APPOINTMENT (OUTPATIENT)
Age: 54
End: 2024-12-18
Payer: COMMERCIAL

## 2025-01-06 ENCOUNTER — OFFICE VISIT (OUTPATIENT)
Age: 55
End: 2025-01-06
Payer: COMMERCIAL

## 2025-01-06 DIAGNOSIS — M54.16 LUMBAR RADICULOPATHY: Primary | ICD-10-CM

## 2025-01-06 DIAGNOSIS — Z74.09 IMPAIRED FUNCTIONAL MOBILITY, BALANCE, GAIT, AND ENDURANCE: ICD-10-CM

## 2025-01-06 PROCEDURE — 97110 THERAPEUTIC EXERCISES: CPT

## 2025-01-06 PROCEDURE — 97164 PT RE-EVAL EST PLAN CARE: CPT

## 2025-01-06 PROCEDURE — 97112 NEUROMUSCULAR REEDUCATION: CPT

## 2025-01-06 NOTE — LETTER
2025    Leidy Vera DO  [address] [place] [address]     Patient: Seema Poe   YOB: 1970   Date of Visit: 2025     Encounter Diagnosis     ICD-10-CM    1. Lumbar radiculopathy  M54.16       2. Impaired functional mobility, balance, gait, and endurance  Z74.09           Dear Dr. Vera:    Thank you for your recent referral of Seema Poe. Please review the attached evaluation summary from Seema's recent visit.     Please verify that you agree with the plan of care by signing the attached order.     If you have any questions or concerns, please do not hesitate to call.     I sincerely appreciate the opportunity to share in the care of one of your patients and hope to have another opportunity to work with you in the near future.       Sincerely,    Roslyn Singh, PT      Referring Provider:      I certify that I have read the below Plan of Care and certify the need for these services furnished under this plan of treatment while under my care.                    Leidy Vera DO  [address] [place] [address]   Via Fax: [phone]          PT Re-Evaluation     Today's date: 2025  Patient name: Seema Poe  : 1970  MRN: [id number]  Referring provider: Leidy Vera DO  Dx:   Encounter Diagnosis     ICD-10-CM    1. Lumbar radiculopathy  M54.16       2. Impaired functional mobility, balance, gait, and endurance  Z74.09           Start Time: 0820  Stop Time: 0900  Total time in clinic (min): 40 minutes    Assessment  Impairments: abnormal gait, abnormal or restricted ROM, activity intolerance, impaired balance, impaired physical strength, pain with function, weight-bearing intolerance, unable to perform ADL, participation limitations, activity limitations and endurance    Assessment details: Seema is a 54 y.o. adult who initially presented to outpatient physical therapy with signs and symptoms consistent with lumbar radiculopathy R >L. With the  exception of improved ROM and increased gross b/l LE strength, pt has not had any significant changes in symptom reactivity and pain level. Pt is able to properly engage her core with  functional activities and does not require verbal cueing from therapist. Primary impairments continue to include ROM deficits, strength deficits, decreased tolerance to activity, decreased muscular endurance, impaired balance, impaired neuromuscular control, and inability to complete basic ADLs. They continue to present with the previously stated impairments which limit their ability to perform STS transfers, floor tranfers, squat, negotiate stairs with reciprocal gait (descending>ascending), and don and doff shoes and socks although improved since IE. Pt was unable to remain in a tandem stance position with the R LE as the lead leg. Seema is currently functioning below their prior level of function and is a good rehab candidate who would benefit from skilled outpatient physical therapy services to allow them to reach their goals and return to PLOF. Pt recommended for 1-2x a week for 4-6 weeks. Pt prognosis for therapy is guarded secondary to chronicity of the injury and complex medical history. PT discussed POC and goals with patient, patient was agreeable.      Physical therapist assistant (PTA) may be utilized to administer treatments as appropriate and in accordance with [facility] Physical Therapy Practice Act.      Goals  STG: To be completed in 4 weeks from 11/4/24:   1. Seema will report pain no worse than 6/10 at worst to indicate decreased pain level and improved tolerance to activity. (In progress)  2. Seema will demonstrate gross 4/5 strength in BLE for improved stability of joint and indicate improvement in functional strength.  (in progress)  3. Seema will tolerate 10 minutes of continuous activity at a time with no increase in pain to indicate improved tolerance to activity. (MET)  4. Seema will demonstrate  ability to sustain a strong 10 second contraction in supine with minimal verbal cueing <50% of the time from therapist. (MET)  5. Seema will be more mindful with sitting and standing posture requiring minimal verbal cueing <50% of the time from therapist to correct biomechanical alignment. (MET)  6. Seema will be independent with basic HEP to allow pt to complete exercises safely when not directly supervised during PT session.  (MET)    LTG: To be completed in 6-8 weeks from 11/4/24 or upon discharge from OPPT:  1. Seema will report pain no worse than 4/10 at worst to indicate decreased pain level and improved tolerance to activity. (In progress)  2. Seema will report 75% improvement in symptoms compared to start of POC to indicate functional improvement and decrease level of disability.  (in progress)  3. Seema will tolerate 30 minutes of consecutive activity at a time with no increase in symptoms to indicate improvement with functional mobility. (MET)  4. Seema will demonstrate gross 4+/5 strength in BLE for improved stability of joint and indicate improvement in functional strength.  (in progress)  5. Seema will be independent with advanced home exercise program to allow patient to transition from physical therapy care to continue with plan of care at home without supervision from therapist and continue to progress with rehabilitation. (In progress)  6. Seema will tolerate single limb stance for 10 seconds on L/R leg to allow patient to have increased stance time on L/R leg during ambulation and increase tolerance to WBing. (In progress)    New goals as of 1/6/25 to be met in 4-6 weeks or upon discharge to an HEP:  1. Seema will be able to perform 10 mini squats without UE support without loss of balance or increase in symptoms.  2. Seema will be able to complete 3 work days without a increase in symptoms/ extreme fatigue.         Plan  Patient would benefit from: PT eval and skilled physical  "therapy  Planned modality interventions: biofeedback, cryotherapy, thermotherapy: hydrocollator packs, ultrasound and unattended electrical stimulation    Planned therapy interventions: balance, balance/weight bearing training, flexibility, functional ROM exercises, gait training, graded exercise, graded activity, graded motor, transfer training, therapeutic exercise, therapeutic activities, stretching, strengthening, patient education, IASTM, joint mobilization, kinesiology taping, manual therapy, massage, Chase taping, neuromuscular re-education, nerve gliding, abdominal trunk stabilization, activity modification, patient/caregiver education, postural training and home exercise program    Frequency: 2x week  Duration in weeks: 6  Plan of Care beginning date: 2025  Plan of Care expiration date: 2025  Treatment plan discussed with: patient        Subjective Evaluation    History of Present Illness  Mechanism of injury: Since starting therapy pt reports she is roughly 25% improved. Pt reports she has returned to work and is having good and bad days. Seema is only able to sit for about 2 hours and 15 minutes, stand for 2.5 hour, and walk for about an hour before onset of symptoms. Pt continues to report difficulty with bending forward, floor transfers, STS transfers, donning shoes and socks, squatting, stair negotiation (descending > ascending)  however it is improved since IE. Pt continues to lose control of her bladder 2x per day.     Patient Goals  Patient goals for therapy: decreased pain, improved balance, increased motion, increased strength, independence with ADLs/IADLs and return to work  Patient goal: \"decrease pain\"(in progress), \"be able to move through ROM with less discomfort\"(in progress), \"be able to negotiate stairs witout modification\"(in progress), \" be able to walk for an hour\"(MET)  Pain  Current pain ratin (low back, b/l hips,  numbness/ stiffness in the R calf)  At best pain " "rating: 3 (low back, b/l hips, and R calf)  At worst pain ratin (low back, b/l hips, numbness/ stiffness into the R calf)  Quality: discomfort, tight, dull ache and radiating (numbness)  Relieving factors: medications, change in position and rest  Aggravating factors: sitting, standing, walking, stair climbing and lifting (bending forward, donning/ doffing socks and shoes, transfers)  Progression: improved    Social Support  Steps to enter house: yes (4 [address] with railing)  Stairs in house: yes (1 flight of stairs to the second floor, basement, and attic with a railing)   Lives in: multiple-level home  Lives with: alone (1 cat)    Employment status: not working (on leave from work)  Hand dominance: right  Exercise history: walking      Diagnostic Tests  X-ray: abnormal (IMPRESSION: Grade 1 degenerative anterolisthesis of L4 and L5 without appreciable subluxation during flexion or extension maneuvers.)        Objective     Postural Observations  Seated posture: fair (sacral sitting)  Standing posture: fair    Additional Postural Observation Details  Excessive lumbar lordosis    Active Range of Motion     Lumbar   Flexion: Active lumbar flexion: hold water sign, stretch in b/l hamstrings.  WFL and with pain  Extension:  with pain Restriction level: moderate  Left lateral flexion: Active left lumbar lateral flexion: 1\" fingter tip past joint line.    with pain  Right lateral flexion: Active right lumbar lateral flexion: 1\" fingter tip past joint line.  with pain  Left rotation:  Restriction level: minimal  Right rotation:  WFL    Strength/Myotome Testing     Left Hip   Planes of Motion   Flexion: 4+  Abduction: 4+  Adduction: 4-    Right Hip   Planes of Motion   Flexion: 4+  Abduction: 4+  Adduction: 4-    Left Knee   Flexion: 5  Extension: 5    Right Knee   Flexion: 5  Extension: 5    Left Ankle/Foot   Dorsiflexion: 5    Right Ankle/Foot   Dorsiflexion: 4+    Functional Assessment        Comments  IE: 24  -  " "STS: x9 from standard height chair without use of UE support; posterior loss of balance x3; pain 6/10 in the low back and b/l hips  - 4 SBA: 2/4 pt was unable to  semi tandem position without loss of balance; lacked proper stepping strategy to regain balance required HHA to recover    RE: 1/6/25  - 30 STS: x10 rom standard height chair without use of UE support; pain 3/10 in the low back and b/l hips  - 4 SBA: 2/4 pt was unable to  tandem position for 10 seconds without loss of balance; L LE in front = 10 sec; R LE in front = 6 sec              Precautions: anterolisthesis of L4-5; fibromyalgia; anxiety      Date: 12/9 12/11 12/17 1/6         Visit #: 11 12 13 14         Manuals    RE         PROM/ stretch of the PIP and DIP              Finger blocking (PIP and DIP of digits 4-5)                                       Neuro Re-Ed             HEP/ EDU RR RR RR RR         DB             DB+TAC             DB+TAC+fallouts             DB+TAC+marches             DB+TAC+90/90             prone             Prone on elbows             Open books             Seated ER stretch             Seated windmills             Hamstring stretch   3x30\" holds ea b/l  3x30\" holds ea b/l         Piriformis stretch   3x30\" holds ea b/l           Calf stretch     2x1' holds at stretch board          Ball roll outs   Blue PB 10x5\" holds ea  Blue PB 10x5\" holds ea         Hip hikes                                                    Ther Ex             Nustep             Bike  10' L2  10' L1         Hip ABD             Hip ADD             Leg press  Plate 4   DL 55# x20     SL 25# x20 ea      Plate 4   DL 55# x20     SL 25# x20 ea         Side stepping    nv         Diagonal walking    nv         4 way hip             Clam shells             Step up     nv         Step downs     nv         Sled push/pulls  Sled only 2 laps ea   nv         3 way hip  X10 ea 1 HHA  nv         Ther Activity                                     "   Gait Training                                       Modalities

## 2025-01-06 NOTE — PROGRESS NOTES
PT Re-Evaluation     Today's date: 2025  Patient name: Seema Poe  : 1970  MRN: [id number]  Referring provider: Leidy Vera DO  Dx:   Encounter Diagnosis     ICD-10-CM    1. Lumbar radiculopathy  M54.16       2. Impaired functional mobility, balance, gait, and endurance  Z74.09           Start Time: 0820  Stop Time: 0900  Total time in clinic (min): 40 minutes    Assessment  Impairments: abnormal gait, abnormal or restricted ROM, activity intolerance, impaired balance, impaired physical strength, pain with function, weight-bearing intolerance, unable to perform ADL, participation limitations, activity limitations and endurance    Assessment details: Seema is a 54 y.o. adult who initially presented to outpatient physical therapy with signs and symptoms consistent with lumbar radiculopathy R >L. With the exception of improved ROM and increased gross b/l LE strength, pt has not had any significant changes in symptom reactivity and pain level. Pt is able to properly engage her core with  functional activities and does not require verbal cueing from therapist. Primary impairments continue to include ROM deficits, strength deficits, decreased tolerance to activity, decreased muscular endurance, impaired balance, impaired neuromuscular control, and inability to complete basic ADLs. They continue to present with the previously stated impairments which limit their ability to perform STS transfers, floor tranfers, squat, negotiate stairs with reciprocal gait (descending>ascending), and don and doff shoes and socks although improved since IE. Pt was unable to remain in a tandem stance position with the R LE as the lead leg. Seema is currently functioning below their prior level of function and is a good rehab candidate who would benefit from skilled outpatient physical therapy services to allow them to reach their goals and return to PLOF. Pt recommended for 1-2x a week for 4-6 weeks. Pt prognosis for  therapy is guarded secondary to chronicity of the injury and complex medical history. PT discussed POC and goals with patient, patient was agreeable.      Physical therapist assistant (PTA) may be utilized to administer treatments as appropriate and in accordance with [facility] Physical Therapy Practice Act.      Goals  STG: To be completed in 4 weeks from 11/4/24:   1. Seema will report pain no worse than 6/10 at worst to indicate decreased pain level and improved tolerance to activity. (In progress)  2. Seema will demonstrate gross 4/5 strength in BLE for improved stability of joint and indicate improvement in functional strength.  (in progress)  3. Seema will tolerate 10 minutes of continuous activity at a time with no increase in pain to indicate improved tolerance to activity. (MET)  4. Seema will demonstrate ability to sustain a strong 10 second contraction in supine with minimal verbal cueing <50% of the time from therapist. (MET)  5. Seema will be more mindful with sitting and standing posture requiring minimal verbal cueing <50% of the time from therapist to correct biomechanical alignment. (MET)  6. Seema will be independent with basic HEP to allow pt to complete exercises safely when not directly supervised during PT session.  (MET)    LTG: To be completed in 6-8 weeks from 11/4/24 or upon discharge from OPPT:  1. Seema will report pain no worse than 4/10 at worst to indicate decreased pain level and improved tolerance to activity. (In progress)  2. Seema will report 75% improvement in symptoms compared to start of POC to indicate functional improvement and decrease level of disability.  (in progress)  3. Seema will tolerate 30 minutes of consecutive activity at a time with no increase in symptoms to indicate improvement with functional mobility. (MET)  4. Seema will demonstrate gross 4+/5 strength in BLE for improved stability of joint and indicate improvement in functional strength.   (in progress)  5. Seema will be independent with advanced home exercise program to allow patient to transition from physical therapy care to continue with plan of care at home without supervision from therapist and continue to progress with rehabilitation. (In progress)  6. Seema will tolerate single limb stance for 10 seconds on L/R leg to allow patient to have increased stance time on L/R leg during ambulation and increase tolerance to WBing. (In progress)    New goals as of 1/6/25 to be met in 4-6 weeks or upon discharge to an [facility]:  1. Seema will be able to perform 10 mini squats without UE support without loss of balance or increase in symptoms.  2. Seema will be able to complete 3 work days without a increase in symptoms/ extreme fatigue.         Plan  Patient would benefit from: PT eval and skilled physical therapy  Planned modality interventions: biofeedback, cryotherapy, thermotherapy: hydrocollator packs, ultrasound and unattended electrical stimulation    Planned therapy interventions: balance, balance/weight bearing training, flexibility, functional ROM exercises, gait training, graded exercise, graded activity, graded motor, transfer training, therapeutic exercise, therapeutic activities, stretching, strengthening, patient education, IASTM, joint mobilization, kinesiology taping, manual therapy, massage, Chase taping, neuromuscular re-education, nerve gliding, abdominal trunk stabilization, activity modification, patient/caregiver education, postural training and home exercise program    Frequency: 2x week  Duration in weeks: 6  Plan of Care beginning date: 1/6/2025  Plan of Care expiration date: 2/17/2025  Treatment plan discussed with: patient        Subjective Evaluation    History of Present Illness  Mechanism of injury: Since starting therapy pt reports she is roughly 25% improved. Pt reports she has returned to work and is having good and bad days. Seema is only able to sit for about 2  "hours and 15 minutes, stand for 2.5 hour, and walk for about an hour before onset of symptoms. Pt continues to report difficulty with bending forward, floor transfers, STS transfers, donning shoes and socks, squatting, stair negotiation (descending > ascending)  however it is improved since IE. Pt continues to lose control of her bladder 2x per day.     Patient Goals  Patient goals for therapy: decreased pain, improved balance, increased motion, increased strength, independence with ADLs/IADLs and return to work  Patient goal: \"decrease pain\"(in progress), \"be able to move through ROM with less discomfort\"(in progress), \"be able to negotiate stairs witout modification\"(in progress), \" be able to walk for an hour\"(MET)  Pain  Current pain ratin (low back, b/l hips,  numbness/ stiffness in the R calf)  At best pain rating: 3 (low back, b/l hips, and R calf)  At worst pain ratin (low back, b/l hips, numbness/ stiffness into the R calf)  Quality: discomfort, tight, dull ache and radiating (numbness)  Relieving factors: medications, change in position and rest  Aggravating factors: sitting, standing, walking, stair climbing and lifting (bending forward, donning/ doffing socks and shoes, transfers)  Progression: improved    Social Support  Steps to enter house: yes (4 [employer] with railing)  Stairs in house: yes (1 flight of stairs to the second floor, basement, and attic with a railing)   Lives in: multiple-level home  Lives with: alone (1 cat)    Employment status: not working (on leave from work)  Hand dominance: right  Exercise history: walking      Diagnostic Tests  X-ray: abnormal (IMPRESSION: Grade 1 degenerative anterolisthesis of L4 and L5 without appreciable subluxation during flexion or extension maneuvers.)        Objective     Postural Observations  Seated posture: fair (sacral sitting)  Standing posture: fair    Additional Postural Observation Details  Excessive lumbar lordosis    Active Range of Motion " "    Lumbar   Flexion: Active lumbar flexion: hold water sign, stretch in b/l hamstrings.  WFL and with pain  Extension:  with pain Restriction level: moderate  Left lateral flexion: Active left lumbar lateral flexion: 1\" fingter tip past joint line.    with pain  Right lateral flexion: Active right lumbar lateral flexion: 1\" fingter tip past joint line.  with pain  Left rotation:  Restriction level: minimal  Right rotation:  WFL    Strength/Myotome Testing     Left Hip   Planes of Motion   Flexion: 4+  Abduction: 4+  Adduction: 4-    Right Hip   Planes of Motion   Flexion: 4+  Abduction: 4+  Adduction: 4-    Left Knee   Flexion: 5  Extension: 5    Right Knee   Flexion: 5  Extension: 5    Left Ankle/Foot   Dorsiflexion: 5    Right Ankle/Foot   Dorsiflexion: 4+    Functional Assessment        Comments  IE: 11/4/24 - 30 STS: x9 from standard height chair without use of UE support; posterior loss of balance x3; pain 6/10 in the low back and b/l hips  - 4 SBA: 2/4 pt was unable to  semi tandem position without loss of balance; lacked proper stepping strategy to regain balance required HHA to recover    RE: 1/6/25 - 30 STS: x10 rom standard height chair without use of UE support; pain 3/10 in the low back and b/l hips  - 4 SBA: 2/4 pt was unable to  tandem position for 10 seconds without loss of balance; L LE in front = 10 sec; R LE in front = 6 sec              Precautions: anterolisthesis of L4-5; fibromyalgia; anxiety      Date: 12/9 12/11 12/17 1/6         Visit #: 11 12 13 14         Manuals    RE         PROM/ stretch of the PIP and DIP              Finger blocking (PIP and DIP of digits 4-5)                                       Neuro Re-Ed             HEP/ EDU RR RR RR RR         DB             DB+TAC             DB+TAC+fallouts             DB+TAC+marches             DB+TAC+90/90             prone             Prone on elbows             Open books             Seated ER stretch             Seated " "windmills             Hamstring stretch   3x30\" holds ea b/l  3x30\" holds ea b/l         Piriformis stretch   3x30\" holds ea b/l           Calf stretch     2x1' holds at stretch board          Ball roll outs   Blue PB 10x5\" holds ea  Blue PB 10x5\" holds ea         Hip hikes                                                    Ther Ex             Nustep             Bike  10' L2  10' L1         Hip ABD             Hip ADD             Leg press  Plate 4   DL 55# x20     SL 25# x20 ea      Plate 4   DL 55# x20     SL 25# x20 ea         Side stepping    nv         Diagonal walking    nv         4 way hip             Clam shells             Step up     nv         Step downs     nv         Sled push/pulls  Sled only 2 laps ea   nv         3 way hip  X10 ea 1 HHA  nv         Ther Activity                                       Gait Training                                       Modalities                                           "

## 2025-01-06 NOTE — PROGRESS NOTES
"Daily Note     Today's date: 2025  Patient name: Seema Poe  : 1970  MRN: [id number]  Referring provider: Leidy Vera DO  Dx:   Encounter Diagnosis     ICD-10-CM    1. Lumbar radiculopathy  M54.16       2. Impaired functional mobility, balance, gait, and endurance  Z74.09                      Subjective: ***      Objective: See treatment diary below      Assessment: Tolerated treatment {Tolerated treatment :9627750720}. Patient {assessment:4842121297}      Plan: {PLAN:6126204681}     Precautions: anterolisthesis of L4-5; fibromyalgia; anxiety         Date:    Visit #: 11 12 13  5 6 7 8 9 10   Manuals       IE for UE      PROM/ stretch of the PIP and DIP        nv RR     Finger blocking (PIP and DIP of digits 4-5)        RR x10 ea                                Neuro Re-Ed             HEP/ EDU RR RR RR  RR RR RR RR RR RR   DB             DB+TAC             DB+TAC+fallouts     10X10\" holds ea        DB+TAC+marches             DB+TAC+90/90             prone             Prone on elbows             Open books     10x10\" holds ea  10x10\" holds ea        Seated ER stretch     nv        Seated windmills     nv        Hamstring stretch   3x30\" holds ea b/l   3x30\" holds ea b/l 3x30\" holds ea b/l   3x30\" holds ea b/l    Piriformis stretch   3x30\" holds ea b/l   3x30\" holds ea b/l 3x30\" holds ea b/l   3x30\" holds ea b/l    Calf stretch       2x1' at stretch board       Ball roll outs   Blue PB 10x5\" holds ea           Ulnar nerve stretch         10x10\" holds ea   10x10\" holds ea    Ulnar nerve glide 15x5\" holds ea       nv   10x10\" holds ea    Tendon glides X15 ea       X10 ea  X10 ea   X10 ea    Wrist extension/ flexion 1# DB x10 ea       X10 ea X10 ea   1# DB x10 ea    UT stretch    3x30\" holds ea b/l    nv 3x30\" holds ea b/l     Lev scap stretch    3x30\" holds ea b/l    nv 3x30\" holds ea b/l     Cervical ROM   10x5\"holds ea     nv 10x5\" holds xin      Hip " "hikes         10x5\" holds ea b/l                                           Ther Ex             Nustep             Bike  10' L2   10' L1 10 L1   10' L1    UBE 1'/1' L1.6  5'/5' L1.6       1'/1' L1.2 for a total of 10'    Hip ABD             Hip ADD             Leg press  Plate 4   DL 55# x20     SL 25# x20 ea       Plate 4     DL 45# x20     SL 25# x20 ea  Plate 4     DL 55# x20     SL 25# x20 ea    Plate 4     DL 55# x20     SL 25# x20 ea     Side stepping         Yellow COB 1 lap    Diagonal walking         Yellow COB 1 lap    4 way hip     X10 ea  X7 SLR > x10 Hip ABD, ADD, extension       Clam shells      x10       Step up          6\" to 12\" step x12 ea     Step downs              Sled push/pulls  Sled only 2 laps ea            3 way hip  X10 ea 1 HHA           putty       nv 1'      Wrist isometrics X12 ea fingers > open fist > closed fist       nv nv  X10 ea fingers >    Digiflex Y/R    X12 ea finger    X12 fist       nv nv  Y/R    X10 ea finger    X10 fist    Therabar R x15 ea        nv  R x10 ea    Clothes pins Y/R x12 ea    Pincer  and 3 finger pinch        nv  Y/R x10 ea    Pincer  and 3 finger pinch    Scap retractions 15x5\" holds ea      nv 10x5\" holds ea   15x5\" holds ea   shrugs 15x5\" holds ea 3# DB       10x5\" holds ea  15x5\" holds ea   Ther Activity                                       Gait Training                                       Modalities                                                "

## 2025-01-13 ENCOUNTER — OFFICE VISIT (OUTPATIENT)
Age: 55
End: 2025-01-13
Payer: COMMERCIAL

## 2025-01-13 DIAGNOSIS — G56.20 ULNAR NERVE PALSY: ICD-10-CM

## 2025-01-13 DIAGNOSIS — M25.611 IMPAIRED RANGE OF MOTION OF RIGHT SHOULDER: ICD-10-CM

## 2025-01-13 DIAGNOSIS — M54.16 LUMBAR RADICULOPATHY: Primary | ICD-10-CM

## 2025-01-13 DIAGNOSIS — M25.642 DECREASED RANGE OF MOTION OF FINGER OF LEFT HAND: ICD-10-CM

## 2025-01-13 DIAGNOSIS — Z74.09 IMPAIRED FUNCTIONAL MOBILITY, BALANCE, GAIT, AND ENDURANCE: ICD-10-CM

## 2025-01-13 DIAGNOSIS — M25.512 ACUTE PAIN OF LEFT SHOULDER: ICD-10-CM

## 2025-01-13 PROCEDURE — 97110 THERAPEUTIC EXERCISES: CPT

## 2025-01-13 PROCEDURE — 97112 NEUROMUSCULAR REEDUCATION: CPT

## 2025-01-13 NOTE — PROGRESS NOTES
Daily Note     Today's date: 2025  Patient name: Seema Poe  : 1970  MRN: [id number]  Referring provider: Leidy Vera DO  Dx:   Encounter Diagnosis     ICD-10-CM    1. Lumbar radiculopathy  M54.16       2. Impaired functional mobility, balance, gait, and endurance  Z74.09       3. Ulnar nerve palsy  G56.20       4. Decreased range of motion of finger of left hand  M25.642       5. Acute pain of left shoulder  M25.512       6. Impaired range of motion of right shoulder  M25.611           Start Time: 1750  Stop Time: 1830  Total time in clinic (min): 40 minutes    Subjective: Pt reports she is feeling okay today. Pt reports she was off from work today and will be returning to work full time starting next week.      Objective: See treatment diary below      Assessment: Pt performed bike aerobic exercises to increase blood flow to the area being treated, prepare the muscles for strength training and stretching, improve overall tolerance to activity, and aerobic endurance. Pt fatigued throughout session appropriately without any increase in symptom. Pt had greatest difficulty with resisted side stepping and standing hip ABD. Pt required verbal cueing from therapist to continue DB and engaging her core with functional activities.  Pt continues to benefit from seated and standing stretches at the end of the session. Pt continues to benefit from physical therapy in order to continue progressing towards goals as outlined and return to PLOF. Will continue to modify and advance current POC based on overall progress and symptom irritability.       Plan: Continue per plan of care.  Progress treatment as tolerated.       Precautions: anterolisthesis of L4-5; fibromyalgia; anxiety      Date:         Visit #: 11 12 13 14 15        Manuals    RE         PROM/ stretch of the PIP and DIP              Finger blocking (PIP and DIP of digits 4-5)                                       Neuro  "Re-Ed             HEP/ EDU RR RR RR RR RR        DB             DB+TAC             DB+TAC+fallouts             DB+TAC+marches             DB+TAC+90/90             prone             Prone on elbows             Open books             Seated ER stretch             Seated windmills             Hamstring stretch   3x30\" holds ea b/l  3x30\" holds ea b/l 3x30\" holds ea b/l        Piriformis stretch   3x30\" holds ea b/l   3x30\" holds ea b/l        Calf stretch     2x1' holds at stretch board  30\" holds ea b/l on stretch board        Ball roll outs   Blue PB 10x5\" holds ea  Blue PB 10x5\" holds ea         Hip hikes                                                    Ther Ex             Nustep             Bike  10' L2  10' L1 10' L1        Hip ABD             Hip ADD             Leg press  Plate 4   DL 55# x20     SL 25# x20 ea      Plate 4   DL 55# x20     SL 25# x20 ea Plate 4   DL 55# x20     SL 25# x20 ea DL 65#    SL 35#       Side stepping    nv Yellow COB 2 laps        Diagonal walking    nv Yellow COB 2 laps        4 way hip             Clam shells             Step up     nv 6\" to 12\" step throughs 2 HHA x10 ea         Step downs     nv 6\" step x10 ea 2 HHA        Sled push/pulls  Sled only 2 laps ea   nv nv        3 way hip  X10 ea 1 HHA  nv X10 ea 1 HHA         Ther Activity                                       Gait Training                                       Modalities                                                "

## 2025-01-20 ENCOUNTER — OFFICE VISIT (OUTPATIENT)
Age: 55
End: 2025-01-20
Payer: COMMERCIAL

## 2025-01-20 DIAGNOSIS — G56.20 ULNAR NERVE PALSY: ICD-10-CM

## 2025-01-20 DIAGNOSIS — M54.16 LUMBAR RADICULOPATHY: Primary | ICD-10-CM

## 2025-01-20 DIAGNOSIS — Z74.09 IMPAIRED FUNCTIONAL MOBILITY, BALANCE, GAIT, AND ENDURANCE: ICD-10-CM

## 2025-01-20 DIAGNOSIS — M25.512 ACUTE PAIN OF LEFT SHOULDER: ICD-10-CM

## 2025-01-20 DIAGNOSIS — M25.642 DECREASED RANGE OF MOTION OF FINGER OF LEFT HAND: ICD-10-CM

## 2025-01-20 DIAGNOSIS — M25.611 IMPAIRED RANGE OF MOTION OF RIGHT SHOULDER: ICD-10-CM

## 2025-01-20 PROCEDURE — 97112 NEUROMUSCULAR REEDUCATION: CPT

## 2025-01-20 PROCEDURE — 97110 THERAPEUTIC EXERCISES: CPT

## 2025-01-20 NOTE — PROGRESS NOTES
"Daily Note     Today's date: 2025  Patient name: Seema Poe  : 1970  MRN: [id number]  Referring provider: Leidy Vera DO  Dx:   Encounter Diagnosis     ICD-10-CM    1. Lumbar radiculopathy  M54.16       2. Impaired functional mobility, balance, gait, and endurance  Z74.09       3. Ulnar nerve palsy  G56.20       4. Decreased range of motion of finger of left hand  M25.642       5. Acute pain of left shoulder  M25.512       6. Impaired range of motion of right shoulder  M25.611           Start Time: 1700  Stop Time: 1745  Total time in clinic (min): 45 minutes    Subjective: Pt reports 7/10 pain today. Pt reports symptoms increased following shoveling and work today.      Objective: See treatment diary below      Assessment: Pt performed bike aerobic exercises to increase blood flow to the area being treated, prepare the muscles for strength training and stretching, improve overall tolerance to activity, and aerobic endurance. Pt did well with supine/ side lying, and seated stretches. Utilized moist heat at the end of her session to decrease overall tissue tension and increase blood flow to the area. Pt left session with 3-4/10 pain. Instructed pt to continue using heat tonight and perform another set of stretches. Pt continues to benefit from physical therapy in order to continue progressing towards goals as outlined and return to PLOF. Will continue to modify and advance current POC based on overall progress and symptom irritability.       Plan: Continue per plan of care.  Progress treatment as tolerated.       Precautions: anterolisthesis of L4-5; fibromyalgia; anxiety      Date:        Visit #: 11 12 13 14 15 16       Manuals    RE         PROM/ stretch of the PIP and DIP              Finger blocking (PIP and DIP of digits 4-5)                                       Neuro Re-Ed             [facility]/ EDU RR RR RR RR RR RR       DB      x10       DB+TAC      10x5\" holds " "ea        DB+TAC+fallouts      10x5\" holds ea       DB+TAC+marches             DB+TAC+90/90             prone             Prone on elbows             Open books             Seated ER stretch             Seated windmills             Hamstring stretch   3x30\" holds ea b/l  3x30\" holds ea b/l 3x30\" holds ea b/l        Piriformis stretch   3x30\" holds ea b/l   3x30\" holds ea b/l        Calf stretch     2x1' holds at stretch board  30\" holds ea b/l on stretch board        Ball roll outs   Blue PB 10x5\" holds ea  Blue PB 10x5\" holds ea  Blue PB 10x5\" holds ea       Hip hikes             Open books      10x5\" holds ea b/l                                 Ther Ex             Nustep             Bike  10' L2  10' L1 10' L1 10' L1       Hip ABD             Hip ADD             Leg press  Plate 4   DL 55# x20     SL 25# x20 ea      Plate 4   DL 55# x20     SL 25# x20 ea Plate 4   DL 55# x20     SL 25# x20 ea  DL 65#    SL 35#      Side stepping    nv Yellow COB 2 laps        Diagonal walking    nv Yellow COB 2 laps        4 way hip             Clam shells             Step up     nv 6\" to 12\" step throughs 2 HHA x10 ea         Step downs     nv 6\" step x10 ea 2 HHA        Sled push/pulls  Sled only 2 laps ea   nv nv        3 way hip  X10 ea 1 HHA  nv X10 ea 1 HHA         Ther Activity                                       Gait Training                                       Modalities             MH      10' at end of session; skin intact prior to and following application of MH                                "

## 2025-01-27 ENCOUNTER — OFFICE VISIT (OUTPATIENT)
Age: 55
End: 2025-01-27
Payer: COMMERCIAL

## 2025-01-27 DIAGNOSIS — M54.16 LUMBAR RADICULOPATHY: Primary | ICD-10-CM

## 2025-01-27 DIAGNOSIS — Z74.09 IMPAIRED FUNCTIONAL MOBILITY, BALANCE, GAIT, AND ENDURANCE: ICD-10-CM

## 2025-01-27 PROCEDURE — 97110 THERAPEUTIC EXERCISES: CPT

## 2025-01-27 PROCEDURE — 97112 NEUROMUSCULAR REEDUCATION: CPT

## 2025-01-27 NOTE — PROGRESS NOTES
"Daily Note     Today's date: 2025  Patient name: Seema Poe  : 1970  MRN: [id number]  Referring provider: Leidy Vera DO  Dx:   Encounter Diagnosis     ICD-10-CM    1. Lumbar radiculopathy  M54.16       2. Impaired functional mobility, balance, gait, and endurance  Z74.09           Start Time: 1700  Stop Time: 1745  Total time in clinic (min): 45 minutes    Subjective: Pt reports she is feeling okay today with 4/10 pain in the low back, notes her knees have started to hurt with standing and walking. Pt continues to report compliance with her HEP and denies any questions or concerns at this time.      Objective: See treatment diary below      Assessment: Pt performed bike aerobic exercises to increase blood flow to the area being treated, prepare the muscles for strength training and stretching, improve overall tolerance to activity, and aerobic endurance. Focused session around low level hip strengthening today due to pt report of knee pain in a weight bearing position. Pt had muscular fatigue with resisted hip ABD and ADD. Pt had no increase in symptoms following the end of the session. Provided pt with updated HEP and resistance bands. Pt continues to benefit from physical therapy in order to continue progressing towards goals as outlined and return to PLOF. Will continue to modify and advance current POC based on overall progress and symptom irritability.       Plan: Continue per plan of care.  Progress treatment as tolerated.       Precautions: anterolisthesis of L4-5; fibromyalgia; anxiety      Date:       Visit #: 11 12 13 14 15 16 17      Manuals    RE         PROM/ stretch of the PIP and DIP              Finger blocking (PIP and DIP of digits 4-5)                                       Neuro Re-Ed             HEP/ EDU RR RR RR RR RR RR RR      DB      x10       DB+TAC      10x5\" holds ea  Throughout session      DB+TAC+fallouts      10x5\" holds ea 10x5\" " "holds ea      DB+TAC+marches             DB+TAC+90/90             prone             Prone on elbows             Open books             Seated ER stretch             Seated windmills             Hamstring stretch   3x30\" holds ea b/l  3x30\" holds ea b/l 3x30\" holds ea b/l  nv      Piriformis stretch   3x30\" holds ea b/l   3x30\" holds ea b/l  nv      Calf stretch     2x1' holds at stretch board  30\" holds ea b/l on stretch board  nv      Ball roll outs   Blue PB 10x5\" holds ea  Blue PB 10x5\" holds ea  Blue PB 10x5\" holds ea nv      Hip hikes             Open books      10x5\" holds ea b/l                                 Ther Ex             Nustep             Bike  10' L2  10' L1 10' L1 10' L1 10' L1      Hip ABD       PTB 2'x10\" holds ea      Hip ADD       2'x10\" holds ea       Leg press  Plate 4   DL 55# x20     SL 25# x20 ea      Plate 4   DL 55# x20     SL 25# x20 ea Plate 4   DL 55# x20     SL 25# x20 ea  Plate 4     DL 65# x20    SL 35#  x20 ea       Side stepping    nv Yellow COB 2 laps        Diagonal walking    nv Yellow COB 2 laps        4 way hip       X15 ea       Clam shells       X20 ea       Step up     nv 6\" to 12\" step throughs 2 HHA x10 ea         Step downs     nv 6\" step x10 ea 2 HHA        Sled push/pulls  Sled only 2 laps ea   nv nv        3 way hip  X10 ea 1 HHA  nv X10 ea 1 HHA         Ther Activity                                       Gait Training                                       Modalities             MH      10' at end of session; skin intact prior to and following application of MH                                  "

## 2025-02-10 ENCOUNTER — OFFICE VISIT (OUTPATIENT)
Age: 55
End: 2025-02-10
Payer: COMMERCIAL

## 2025-02-10 DIAGNOSIS — Z74.09 IMPAIRED FUNCTIONAL MOBILITY, BALANCE, GAIT, AND ENDURANCE: ICD-10-CM

## 2025-02-10 DIAGNOSIS — M25.512 ACUTE PAIN OF LEFT SHOULDER: ICD-10-CM

## 2025-02-10 DIAGNOSIS — M25.642 DECREASED RANGE OF MOTION OF FINGER OF LEFT HAND: ICD-10-CM

## 2025-02-10 DIAGNOSIS — M54.16 LUMBAR RADICULOPATHY: Primary | ICD-10-CM

## 2025-02-10 DIAGNOSIS — G56.20 ULNAR NERVE PALSY: ICD-10-CM

## 2025-02-10 DIAGNOSIS — M25.611 IMPAIRED RANGE OF MOTION OF RIGHT SHOULDER: ICD-10-CM

## 2025-02-10 PROCEDURE — 97112 NEUROMUSCULAR REEDUCATION: CPT

## 2025-02-10 PROCEDURE — 97110 THERAPEUTIC EXERCISES: CPT

## 2025-02-10 NOTE — PROGRESS NOTES
Daily Note     Today's date: 2/10/2025  Patient name: Seema Poe  : 1970  MRN: [id number]  Referring provider: Leidy Vera DO  Dx:   Encounter Diagnosis     ICD-10-CM    1. Lumbar radiculopathy  M54.16       2. Impaired functional mobility, balance, gait, and endurance  Z74.09       3. Ulnar nerve palsy  G56.20       4. Decreased range of motion of finger of left hand  M25.642       5. Acute pain of left shoulder  M25.512       6. Impaired range of motion of right shoulder  M25.611           Start Time: 1700  Stop Time: 1745  Total time in clinic (min): 45 minutes    Subjective: Pt reports 4/10 pain in the low back. Pt denies compliance with HEP due to busy schedule.      Objective: See treatment diary below      Assessment: Pt performed bike aerobic exercises to increase blood flow to the area being treated, prepare the muscles for strength training and stretching, improve overall tolerance to activity, and aerobic endurance. Pt did well with session as outlined without any increase in symptoms. Pt required intermittent verbal and tactile cueing from therapist for posture/ position with sled push/ pulls. Pt required no verbal cueing from therapist for core activation and DB throughout session. Next session will perform re-eval.  Pt continues to benefit from physical therapy in order to continue progressing towards goals as outlined and return to PLOF. Will continue to modify and advance current POC based on overall progress and symptom irritability.       Plan: Continue per plan of care.  Progress treatment as tolerated.       Precautions: anterolisthesis of L4-5; fibromyalgia; anxiety      Date: 12/9 12/11 12/17 1/6 1/13 1/20 1/27 2/10     Visit #: 11 12 13 14 15 16 17 18     Manuals    RE         PROM/ stretch of the PIP and DIP              Finger blocking (PIP and DIP of digits 4-5)                                       Neuro Re-Ed             HEP/ EDU RR RR RR RR RR RR RR RR     DB      x10      "  DB+TAC      10x5\" holds ea  Throughout session Throughout session     DB+TAC+fallouts      10x5\" holds ea 10x5\" holds ea Throughout session     DB+TAC+marches             DB+TAC+90/90             prone             Prone on elbows             Open books             Seated ER stretch             Seated windmills             Hamstring stretch   3x30\" holds ea b/l  3x30\" holds ea b/l 3x30\" holds ea b/l  nv 3x30\" holds ea b/l     Piriformis stretch   3x30\" holds ea b/l   3x30\" holds ea b/l  nv 3x30\" holds ea b/l     Calf stretch     2x1' holds at stretch board  30\" holds ea b/l on stretch board  nv 1' at stretch board      Ball roll outs   Blue PB 10x5\" holds ea  Blue PB 10x5\" holds ea  Blue PB 10x5\" holds ea nv Blue PB 10x5\" holds ea 3 directions     Hip hikes             Open books      10x5\" holds ea b/l                                 Ther Ex             Nustep             Bike  10' L2  10' L1 10' L1 10' L1 10' L1 10' L1      Hip ABD       PTB 2'x10\" holds ea      Hip ADD       2'x10\" holds ea       Leg press  Plate 4   DL 55# x20     SL 25# x20 ea      Plate 4   DL 55# x20     SL 25# x20 ea Plate 4   DL 55# x20     SL 25# x20 ea  Plate 4     DL 65# x20    SL 35#  x20 ea  Plate 4     DL 65# x20    SL 35#  x20 ea      Side stepping    nv Yellow COB 2 laps        Diagonal walking    nv Yellow COB 2 laps        4 way hip       X15 ea       Clam shells       X20 ea       Step up     nv 6\" to 12\" step throughs 2 HHA x10 ea    6\" to 12\" step throughs 2 HHA x10 ea      Step downs     nv 6\" step x10 ea 2 HHA   6\" step x10 ea 2 HHA     Sled push/pulls  Sled only 2 laps ea   nv nv   2 laps no added weight      3 way hip  X10 ea 1 HHA  nv X10 ea 1 HHA         Ther Activity                                       Gait Training                                       Modalities             MH      10' at end of session; skin intact prior to and following application of MH                                    "

## 2025-02-17 ENCOUNTER — EVALUATION (OUTPATIENT)
Age: 55
End: 2025-02-17
Payer: COMMERCIAL

## 2025-02-17 DIAGNOSIS — M54.16 LUMBAR RADICULOPATHY: Primary | ICD-10-CM

## 2025-02-17 DIAGNOSIS — Z74.09 IMPAIRED FUNCTIONAL MOBILITY, BALANCE, GAIT, AND ENDURANCE: ICD-10-CM

## 2025-02-17 PROCEDURE — 97110 THERAPEUTIC EXERCISES: CPT

## 2025-02-17 PROCEDURE — 97164 PT RE-EVAL EST PLAN CARE: CPT

## 2025-02-17 NOTE — LETTER
2025    Leidy Vera DO  [address] [place] [address]     Patient: Seema Poe   YOB: 1970   Date of Visit: 2025     Encounter Diagnosis     ICD-10-CM    1. Lumbar radiculopathy  M54.16       2. Impaired functional mobility, balance, gait, and endurance  Z74.09           Dear Dr. Vera:    Thank you for your recent referral of Seema Poe. Please review the attached evaluation summary from Seema's recent visit.     Please verify that you agree with the plan of care by signing the attached order.     If you have any questions or concerns, please do not hesitate to call.     I sincerely appreciate the opportunity to share in the care of one of your patients and hope to have another opportunity to work with you in the near future.       Sincerely,    Roslyn Singh, PT      Referring Provider:      I certify that I have read the below Plan of Care and certify the need for these services furnished under this plan of treatment while under my care.                    Leidy Vera DO  [address] [place] [address]   Via Fax: [phone]          PT Re-Evaluation     Today's date: 2025  Patient name: Seema Poe  : 1970  MRN: [id number]  Referring provider: Leidy Vera DO  Dx:   Encounter Diagnosis     ICD-10-CM    1. Lumbar radiculopathy  M54.16       2. Impaired functional mobility, balance, gait, and endurance  Z74.09             Start Time: 1700  Stop Time: 1745  Total time in clinic (min): 45 minutes    Assessment  Impairments: abnormal or restricted ROM, activity intolerance, impaired balance, impaired physical strength, pain with function, weight-bearing intolerance, participation limitations, activity limitations and endurance    Assessment details: Seema is a 54 y.o. adult who initially presented to outpatient physical therapy with signs and symptoms consistent with lumbar radiculopathy R >L. Since last re-eval pt has had mild increase in LE  strength and improvements in ROM into rotation and lateral flexion. Pt is able to perform a strong core contraction in supine for 5 seconds without any coning and doming noted. Pt had mild difficulty engaging her core in standing requiring verbal cueing from therapist to engage about 50% of the time. Primary impairments continue to include ROM deficits, strength deficits, decreased tolerance to activity, decreased muscular endurance, impaired balance, impaired neuromuscular control, and inability to complete basic ADLs. They continue to present with the previously stated impairments which limit their ability to perform STS transfers, floor tranfers, squat, negotiate stairs with reciprocal gait (descending>ascending), and don and doff shoes and socks although improved since IE. Pt was able to progress to SLB, unable to remain balance for 10 seconds on the L LE. Goals will remain the same until more progress is made on current goals.Seema is currently functioning below their prior level of function and is a good rehab candidate who would benefit from skilled outpatient physical therapy services to allow them to reach their goals and return to PLOF. Pt recommended for 1-2x a week for 4-6 weeks. Pt reports she is ready to discharge to an HEP following her next scheduled session. Will trial a d/c to an HEP for 30 days before formal d/c. Pt prognosis for therapy is guarded secondary to chronicity of the injury and complex medical history. PT discussed POC and goals with patient, patient was agreeable.      Physical therapist assistant (PTA) may be utilized to administer treatments as appropriate and in accordance with [facility] Physical Therapy Practice Act.      Goals  STG: To be completed in 4 weeks from 11/4/24:   1. Seema will report pain no worse than 6/10 at worst to indicate decreased pain level and improved tolerance to activity. (In progress)  2. Seema will demonstrate gross 4/5 strength in BLE for  improved stability of joint and indicate improvement in functional strength.  (MET)      LTG: To be completed in 6-8 weeks from 11/4/24 or upon discharge from OPPT:  1. Seema will report pain no worse than 4/10 at worst to indicate decreased pain level and improved tolerance to activity. (In progress)  2. Seema will report 75% improvement in symptoms compared to start of POC to indicate functional improvement and decrease level of disability.  (in progress)  3. Seema will demonstrate gross 4+/5 strength in BLE for improved stability of joint and indicate improvement in functional strength.  (in progress)  4. Seema will be independent with advanced home exercise program to allow patient to transition from physical therapy care to continue with plan of care at home without supervision from therapist and continue to progress with rehabilitation. (In progress)  5. Seema will tolerate single limb stance for 10 seconds on L/R leg to allow patient to have increased stance time on L/R leg during ambulation and increase tolerance to WBing. (In progress)    New goals as of 1/6/25 to be met in 4-6 weeks or upon discharge to an [facility]:  1. Seema will be able to perform 10 mini squats without UE support without loss of balance or increase in symptoms. (MET)  2. Seema will be able to complete 3 work days without a increase in symptoms/ extreme fatigue. (In progress)         Plan  Patient would benefit from: PT eval and skilled physical therapy  Planned modality interventions: biofeedback, cryotherapy, thermotherapy: hydrocollator packs, ultrasound and unattended electrical stimulation    Planned therapy interventions: balance, balance/weight bearing training, flexibility, functional ROM exercises, gait training, graded exercise, graded activity, graded motor, transfer training, therapeutic exercise, therapeutic activities, stretching, strengthening, patient education, IASTM, joint mobilization, kinesiology taping, manual  "therapy, massage, Chase taping, neuromuscular re-education, nerve gliding, abdominal trunk stabilization, activity modification, patient/caregiver education, postural training and home exercise program    Duration in weeks: 6  Plan of Care beginning date: 2025  Plan of Care expiration date: 3/31/2025  Treatment plan discussed with: patient        Subjective Evaluation    History of Present Illness  Mechanism of injury: Since starting therapy, pt reports she is roughly 50% improved. Pt reports that on average she is having more good days. Pt reports improvements with bending forward, prolonged standing, STS transfers, donning shoes and socks, and stair negotiation. Pt continues to have difficulty/ discomfort with floor transfers and squatting. Seema is only able to sit for about 2 hours and 15 minutes, stand for 2.5-3 hours, and walk for 1.5 hours before onset of symptoms. Pt reports continued lose of bladder 4-5x per day, increased since having a D&C on 25.     Patient Goals  Patient goals for therapy: decreased pain, improved balance, increased motion, increased strength, independence with ADLs/IADLs and return to work  Patient goal: \"decrease pain\"(in progress), \"be able to move through ROM with less discomfort\"(in progress), \"be able to negotiate stairs witout modification\"(in progress)  Pain  Current pain rating: 3 (low back)  At best pain rating: 3 (low back, b/l hips, and R calf)  At worst pain ratin (low back, b/l hips, numbness/ stiffness into the R calf)  Quality: discomfort, tight, dull ache and radiating (numbness)  Relieving factors: medications, change in position and rest  Aggravating factors: sitting, standing, walking, stair climbing and lifting (bending forward, donning/ doffing socks and shoes, transfers)  Progression: improved    Social Support  Steps to enter house: yes (4 [address] with railing)  Stairs in house: yes (1 flight of stairs to the second floor, basement, and attic with " "a railing)   Lives in: multiple-level home  Lives with: alone (1 cat)    Employment status: not working (on leave from work)  Hand dominance: right  Exercise history: walking      Diagnostic Tests  X-ray: abnormal (IMPRESSION: Grade 1 degenerative anterolisthesis of L4 and L5 without appreciable subluxation during flexion or extension maneuvers.)        Objective     Postural Observations  Seated posture: fair (sacral sitting)  Standing posture: fair    Additional Postural Observation Details  Excessive lumbar lordosis    Active Range of Motion     Lumbar   Flexion: Active lumbar flexion: hold water sign, stretch in b/l hamstrings.  WFL  Extension:  with pain Restriction level: minimal  Left lateral flexion: Active left lumbar lateral flexion: 1\" fingter tip to joint line.    with pain  Right lateral flexion: Active right lumbar lateral flexion: 1\" fingter tip to joint line.  with pain  Left rotation:  WFL  Right rotation:  WFL    Strength/Myotome Testing     Left Hip   Planes of Motion   Flexion: 4+  Abduction: 4+  Adduction: 4    Right Hip   Planes of Motion   Flexion: 4+  Abduction: 4+  Adduction: 4    Left Knee   Flexion: 5  Extension: 5    Right Knee   Flexion: 5  Extension: 5    Left Ankle/Foot   Dorsiflexion: 5    Right Ankle/Foot   Dorsiflexion: 5    Muscle Activation     Additional Muscle Activation Details  Pt is able to perform a strong core contraction in supine for 5 seconds. No coning or doming noted.    Functional Assessment        Comments  IE: 11/4/24 - 30 STS: x9 from standard height chair without use of UE support; posterior loss of balance x3; pain 6/10 in the low back and b/l hips  - 4 SBA: 2/4 pt was unable to  semi tandem position without loss of balance; lacked proper stepping strategy to regain balance required HHA to recover    RE: 1/6/25 - 30 STS: x10 rom standard height chair without use of UE support; pain 3/10 in the low back and b/l hips  - 4 SBA: 2/4 pt was unable to stand " "in tandem position for 10 seconds without loss of balance; L LE in front = 10 sec; R LE in front = 6 sec     RE: 2/17/25  - 30 STS: x12 rom standard height chair without use of UE support  - 4 SBA: 3/4 pt was unable to  SLB on L LE for 10 seconds without loss of balance; L LE = 8 sec; R LE = 10 sec              Precautions: anterolisthesis of L4-5; fibromyalgia; anxiety      Date: 12/9 12/11 12/17 1/6 1/13 1/20 1/27 2/10 2/17    Visit #: 11 12 13 14 15 16 17 18 19    Manuals    RE         PROM/ stretch of the PIP and DIP              Finger blocking (PIP and DIP of digits 4-5)                                       Neuro Re-Ed             HEP/ EDU RR RR RR RR RR RR RR RR RR    DB      x10       DB+TAC      10x5\" holds ea  Throughout session Throughout session Throughout session    DB+TAC+fallouts      10x5\" holds ea 10x5\" holds ea Throughout session Throughout session    DB+TAC+marches             DB+TAC+90/90             prone             Prone on elbows             Open books             Seated ER stretch             Seated windmills             Hamstring stretch   3x30\" holds ea b/l  3x30\" holds ea b/l 3x30\" holds ea b/l  nv 3x30\" holds ea b/l     Piriformis stretch   3x30\" holds ea b/l   3x30\" holds ea b/l  nv 3x30\" holds ea b/l     Calf stretch     2x1' holds at stretch board  30\" holds ea b/l on stretch board  nv 1' at stretch board  2x1' at stretch board     Ball roll outs   Blue PB 10x5\" holds ea  Blue PB 10x5\" holds ea  Blue PB 10x5\" holds ea nv Blue PB 10x5\" holds ea 3 directions     Hip hikes             Open books      10x5\" holds ea b/l                                 Ther Ex             Nustep             Bike  10' L2  10' L1 10' L1 10' L1 10' L1 10' L1  10' L1    Hip ABD       PTB 2'x10\" holds ea      Hip ADD       2'x10\" holds ea       Leg press  Plate 4   DL 55# x20     SL 25# x20 ea      Plate 4   DL 55# x20     SL 25# x20 ea Plate 4   DL 55# x20     SL 25# x20 ea  Plate 4     DL 65# " "x20    SL 35#  x20 ea  Plate 4     DL 65# x20    SL 35#  x20 ea  Plate 4     DL 65# x20    SL 35#  x20 ea     Side stepping    nv Yellow COB 2 laps        Diagonal walking    nv Yellow COB 2 laps        4 way hip       X15 ea       Clam shells       X20 ea       Step up     nv 6\" to 12\" step throughs 2 HHA x10 ea    6\" to 12\" step throughs 2 HHA x10 ea      Step downs     nv 6\" step x10 ea 2 HHA   6\" step x10 ea 2 HHA     Sled push/pulls  Sled only 2 laps ea   nv nv   2 laps no added weight      3 way hip  X10 ea 1 HHA  nv X10 ea 1 HHA     X10 ea 1 HHA    Ther Activity                                       Gait Training                                       Modalities                   10' at end of session; skin intact prior to and following application of MH                                         "

## 2025-02-17 NOTE — PROGRESS NOTES
PT Re-Evaluation     Today's date: 2025  Patient name: Seema Poe  : 1970  MRN: [id number]  Referring provider: Leidy Vera DO  Dx:   Encounter Diagnosis     ICD-10-CM    1. Lumbar radiculopathy  M54.16       2. Impaired functional mobility, balance, gait, and endurance  Z74.09             Start Time: 1700  Stop Time: 1745  Total time in clinic (min): 45 minutes    Assessment  Impairments: abnormal or restricted ROM, activity intolerance, impaired balance, impaired physical strength, pain with function, weight-bearing intolerance, participation limitations, activity limitations and endurance    Assessment details: Seema is a 54 y.o. adult who initially presented to outpatient physical therapy with signs and symptoms consistent with lumbar radiculopathy R >L. Since last re-eval pt has had mild increase in LE strength and improvements in ROM into rotation and lateral flexion. Pt is able to perform a strong core contraction in supine for 5 seconds without any coning and doming noted. Pt had mild difficulty engaging her core in standing requiring verbal cueing from therapist to engage about 50% of the time. Primary impairments continue to include ROM deficits, strength deficits, decreased tolerance to activity, decreased muscular endurance, impaired balance, impaired neuromuscular control, and inability to complete basic ADLs. They continue to present with the previously stated impairments which limit their ability to perform STS transfers, floor tranfers, squat, negotiate stairs with reciprocal gait (descending>ascending), and don and doff shoes and socks although improved since IE. Pt was able to progress to SLB, unable to remain balance for 10 seconds on the L LE. Goals will remain the same until more progress is made on current goals.Seema is currently functioning below their prior level of function and is a good rehab candidate who would benefit from skilled outpatient physical therapy  services to allow them to reach their goals and return to PLOF. Pt recommended for 1-2x a week for 4-6 weeks. Pt reports she is ready to discharge to an HEP following her next scheduled session. Will trial a d/c to an HEP for 30 days before formal d/c. Pt prognosis for therapy is guarded secondary to chronicity of the injury and complex medical history. PT discussed POC and goals with patient, patient was agreeable.      Physical therapist assistant (PTA) may be utilized to administer treatments as appropriate and in accordance with [facility] Physical Therapy Practice Act.      Goals  STG: To be completed in 4 weeks from 11/4/24:   1. Seema will report pain no worse than 6/10 at worst to indicate decreased pain level and improved tolerance to activity. (In progress)  2. Seema will demonstrate gross 4/5 strength in BLE for improved stability of joint and indicate improvement in functional strength.  (MET)      LTG: To be completed in 6-8 weeks from 11/4/24 or upon discharge from OPPT:  1. Seema will report pain no worse than 4/10 at worst to indicate decreased pain level and improved tolerance to activity. (In progress)  2. Seema will report 75% improvement in symptoms compared to start of POC to indicate functional improvement and decrease level of disability.  (in progress)  3. Seema will demonstrate gross 4+/5 strength in BLE for improved stability of joint and indicate improvement in functional strength.  (in progress)  4. Seema will be independent with advanced home exercise program to allow patient to transition from physical therapy care to continue with plan of care at home without supervision from therapist and continue to progress with rehabilitation. (In progress)  5. Seema will tolerate single limb stance for 10 seconds on L/R leg to allow patient to have increased stance time on L/R leg during ambulation and increase tolerance to WBing. (In progress)    New goals as of 1/6/25 to be met in  4-6 weeks or upon discharge to an [facility]:  1. Seema will be able to perform 10 mini squats without UE support without loss of balance or increase in symptoms. (MET)  2. Seema will be able to complete 3 work days without a increase in symptoms/ extreme fatigue. (In progress)         Plan  Patient would benefit from: PT eval and skilled physical therapy  Planned modality interventions: biofeedback, cryotherapy, thermotherapy: hydrocollator packs, ultrasound and unattended electrical stimulation    Planned therapy interventions: balance, balance/weight bearing training, flexibility, functional ROM exercises, gait training, graded exercise, graded activity, graded motor, transfer training, therapeutic exercise, therapeutic activities, stretching, strengthening, patient education, IASTM, joint mobilization, kinesiology taping, manual therapy, massage, Chase taping, neuromuscular re-education, nerve gliding, abdominal trunk stabilization, activity modification, patient/caregiver education, postural training and home exercise program    Duration in weeks: 6  Plan of Care beginning date: 2/17/2025  Plan of Care expiration date: 3/31/2025  Treatment plan discussed with: patient        Subjective Evaluation    History of Present Illness  Mechanism of injury: Since starting therapy, pt reports she is roughly 50% improved. Pt reports that on average she is having more good days. Pt reports improvements with bending forward, prolonged standing, STS transfers, donning shoes and socks, and stair negotiation. Pt continues to have difficulty/ discomfort with floor transfers and squatting. Seema is only able to sit for about 2 hours and 15 minutes, stand for 2.5-3 hours, and walk for 1.5 hours before onset of symptoms. Pt reports continued lose of bladder 4-5x per day, increased since having a D&C on 2/11/25.     Patient Goals  Patient goals for therapy: decreased pain, improved balance, increased motion, increased strength,  "independence with ADLs/IADLs and return to work  Patient goal: \"decrease pain\"(in progress), \"be able to move through ROM with less discomfort\"(in progress), \"be able to negotiate stairs witout modification\"(in progress)  Pain  Current pain rating: 3 (low back)  At best pain rating: 3 (low back, b/l hips, and R calf)  At worst pain ratin (low back, b/l hips, numbness/ stiffness into the R calf)  Quality: discomfort, tight, dull ache and radiating (numbness)  Relieving factors: medications, change in position and rest  Aggravating factors: sitting, standing, walking, stair climbing and lifting (bending forward, donning/ doffing socks and shoes, transfers)  Progression: improved    Social Support  Steps to enter house: yes (4 [address] with railing)  Stairs in house: yes (1 flight of stairs to the second floor, basement, and attic with a railing)   Lives in: multiple-level home  Lives with: alone (1 cat)    Employment status: not working (on leave from work)  Hand dominance: right  Exercise history: walking      Diagnostic Tests  X-ray: abnormal (IMPRESSION: Grade 1 degenerative anterolisthesis of L4 and L5 without appreciable subluxation during flexion or extension maneuvers.)        Objective     Postural Observations  Seated posture: fair (sacral sitting)  Standing posture: fair    Additional Postural Observation Details  Excessive lumbar lordosis    Active Range of Motion     Lumbar   Flexion: Active lumbar flexion: hold water sign, stretch in b/l hamstrings.  WFL  Extension:  with pain Restriction level: minimal  Left lateral flexion: Active left lumbar lateral flexion: 1\" fingter tip to joint line.    with pain  Right lateral flexion: Active right lumbar lateral flexion: 1\" fingter tip to joint line.  with pain  Left rotation:  WFL  Right rotation:  WFL    Strength/Myotome Testing     Left Hip   Planes of Motion   Flexion: 4+  Abduction: 4+  Adduction: 4    Right Hip   Planes of Motion   Flexion: 4+  Abduction: " "4+  Adduction: 4    Left Knee   Flexion: 5  Extension: 5    Right Knee   Flexion: 5  Extension: 5    Left Ankle/Foot   Dorsiflexion: 5    Right Ankle/Foot   Dorsiflexion: 5    Muscle Activation     Additional Muscle Activation Details  Pt is able to perform a strong core contraction in supine for 5 seconds. No coning or doming noted.    Functional Assessment        Comments  IE: 11/4/24 - 30 STS: x9 from standard height chair without use of UE support; posterior loss of balance x3; pain 6/10 in the low back and b/l hips  - 4 SBA: 2/4 pt was unable to  semi tandem position without loss of balance; lacked proper stepping strategy to regain balance required HHA to recover    RE: 1/6/25 - 30 STS: x10 rom standard height chair without use of UE support; pain 3/10 in the low back and b/l hips  - 4 SBA: 2/4 pt was unable to  tandem position for 10 seconds without loss of balance; L LE in front = 10 sec; R LE in front = 6 sec     RE: 2/17/25 - 30 STS: x12 rom standard height chair without use of UE support  - 4 SBA: 3/4 pt was unable to  SLB on L LE for 10 seconds without loss of balance; L LE = 8 sec; R LE = 10 sec              Precautions: anterolisthesis of L4-5; fibromyalgia; anxiety      Date: 12/9 12/11 12/17 1/6 1/13 1/20 1/27 2/10 2/17    Visit #: 11 12 13 14 15 16 17 18 19    Manuals    RE         PROM/ stretch of the PIP and DIP              Finger blocking (PIP and DIP of digits 4-5)                                       Neuro Re-Ed             [facility]/ EDU RR RR RR RR RR RR RR RR RR    DB      x10       DB+TAC      10x5\" holds ea  Throughout session Throughout session Throughout session    DB+TAC+fallouts      10x5\" holds ea 10x5\" holds ea Throughout session Throughout session    DB+TAC+marches             DB+TAC+90/90             prone             Prone on elbows             Open books             Seated ER stretch             Seated windmills             Hamstring stretch   3x30\" holds " "ea b/l  3x30\" holds ea b/l 3x30\" holds ea b/l  nv 3x30\" holds ea b/l     Piriformis stretch   3x30\" holds ea b/l   3x30\" holds ea b/l  nv 3x30\" holds ea b/l     Calf stretch     2x1' holds at stretch board  30\" holds ea b/l on stretch board  nv 1' at stretch board  2x1' at stretch board     Ball roll outs   Blue PB 10x5\" holds ea  Blue PB 10x5\" holds ea  Blue PB 10x5\" holds ea nv Blue PB 10x5\" holds ea 3 directions     Hip hikes             Open books      10x5\" holds ea b/l                                 Ther Ex             Nustep             Bike  10' L2  10' L1 10' L1 10' L1 10' L1 10' L1  10' L1    Hip ABD       PTB 2'x10\" holds ea      Hip ADD       2'x10\" holds ea       Leg press  Plate 4   DL 55# x20     SL 25# x20 ea      Plate 4   DL 55# x20     SL 25# x20 ea Plate 4   DL 55# x20     SL 25# x20 ea  Plate 4     DL 65# x20    SL 35#  x20 ea  Plate 4     DL 65# x20    SL 35#  x20 ea  Plate 4     DL 65# x20    SL 35#  x20 ea     Side stepping    nv Yellow COB 2 laps        Diagonal walking    nv Yellow COB 2 laps        4 way hip       X15 ea       Clam shells       X20 ea       Step up     nv 6\" to 12\" step throughs 2 HHA x10 ea    6\" to 12\" step throughs 2 HHA x10 ea      Step downs     nv 6\" step x10 ea 2 HHA   6\" step x10 ea 2 HHA     Sled push/pulls  Sled only 2 laps ea   nv nv   2 laps no added weight      3 way hip  X10 ea 1 HHA  nv X10 ea 1 HHA     X10 ea 1 HHA    Ther Activity                                       Gait Training                                       Modalities             MH      10' at end of session; skin intact prior to and following application of MH                           "

## 2025-02-24 ENCOUNTER — OFFICE VISIT (OUTPATIENT)
Age: 55
End: 2025-02-24
Payer: COMMERCIAL

## 2025-02-24 DIAGNOSIS — Z74.09 IMPAIRED FUNCTIONAL MOBILITY, BALANCE, GAIT, AND ENDURANCE: ICD-10-CM

## 2025-02-24 DIAGNOSIS — M54.16 LUMBAR RADICULOPATHY: Primary | ICD-10-CM

## 2025-02-24 PROCEDURE — 97112 NEUROMUSCULAR REEDUCATION: CPT

## 2025-02-24 PROCEDURE — 97110 THERAPEUTIC EXERCISES: CPT

## 2025-02-24 NOTE — PROGRESS NOTES
"Daily Note     Today's date: 2025  Patient name: Seema Poe  : 1970  MRN: [id number]  Referring provider: Leidy Vera DO  Dx:   Encounter Diagnosis     ICD-10-CM    1. Lumbar radiculopathy  M54.16       2. Impaired functional mobility, balance, gait, and endurance  Z74.09             Start Time: 1704  Stop Time: 1745  Total time in clinic (min): 41 minutes    Subjective: Pt reports pain earlier today was 6/10 but currently a little bit better. Pt reports that she is prepared to trial a discharge to an HEP for the next 30 days as she has a lot of other appointments occupying her time.       Objective: See treatment diary below      Assessment: Pt performed bike aerobic exercises to increase blood flow to the area being treated, prepare the muscles for strength training and stretching, improve overall tolerance to activity, and aerobic endurance. Pt is independent with her program and is appropriate for trial discharge to an HEP. Pt continues to benefit from her HEP to continue improving strength and flexibility in addition to managing her pain. Provided pt with an updated HEP and resistance band. Discussed trial discharge with pt, pt was agreeable to plan.    Plan: Continue per plan of care.  Progress treatment as tolerated.       Precautions: anterolisthesis of L4-5; fibromyalgia; anxiety      Date: 12/9 12/11 12/17 1/6 1/13 1/20 1/27 2/10 2/17 2/24   Visit #: 11 12 13 14 15 16 17 18 19 20   Manuals    RE         PROM/ stretch of the PIP and DIP              Finger blocking (PIP and DIP of digits 4-5)                                       Neuro Re-Ed             HEP/ EDU RR RR RR RR RR RR RR RR RR RR   DB      x10       DB+TAC      10x5\" holds ea  Throughout session Throughout session Throughout session Throughout session   DB+TAC+fallouts      10x5\" holds ea 10x5\" holds ea Throughout session Throughout session Throughout session   DB+TAC+march             DB+TAC+90/90             prone         " "    Prone on elbows             Open books             Seated ER stretch             Seated windmills             Hamstring stretch   3x30\" holds ea b/l  3x30\" holds ea b/l 3x30\" holds ea b/l  nv 3x30\" holds ea b/l  3x30\" holds ea b/l   Piriformis stretch   3x30\" holds ea b/l   3x30\" holds ea b/l  nv 3x30\" holds ea b/l     Calf stretch     2x1' holds at stretch board  30\" holds ea b/l on stretch board  nv 1' at stretch board  2x1' at stretch board  2x1' at stretch board    Ball roll outs   Blue PB 10x5\" holds ea  Blue PB 10x5\" holds ea  Blue PB 10x5\" holds ea nv Blue PB 10x5\" holds ea 3 directions  Blue PB 10x5\" holds ea 3 directions   Hip hikes             Open books      10x5\" holds ea b/l       Seated windmills          10x5\" holds ea b/l   Seated ER stretch           10x5\" holds ea                             Ther Ex             Nustep             Bike  10' L2  10' L1 10' L1 10' L1 10' L1 10' L1  10' L1 Level 1 10 min    Hip ABD       PTB 2'x10\" holds ea      Hip ADD       2'x10\" holds ea       Leg press  Plate 4   DL 55# x20     SL 25# x20 ea      Plate 4   DL 55# x20     SL 25# x20 ea Plate 4   DL 55# x20     SL 25# x20 ea  Plate 4     DL 65# x20    SL 35#  x20 ea  Plate 4     DL 65# x20    SL 35#  x20 ea  Plate 4     DL 65# x20    SL 35#  x20 ea  Plate 4     DL 65# x20    SL 35#  x20 ea   Side stepping    nv Yellow COB 2 laps        Diagonal walking    nv Yellow COB 2 laps        4 way hip       X15 ea       Clam shells       X20 ea       Step up     nv 6\" to 12\" step throughs 2 HHA x10 ea    6\" to 12\" step throughs 2 HHA x10 ea      Step downs     nv 6\" step x10 ea 2 HHA   6\" step x10 ea 2 HHA     Sled push/pulls  Sled only 2 laps ea   nv nv   2 laps no added weight      3 way hip  X10 ea 1 HHA  nv X10 ea 1 HHA     X10 ea 1 HHA X10 ea 1 HHA   Ther Activity                                       Gait Training                                       Modalities             MH      10' at end of session; skin " intact prior to and following application of MH

## 2025-04-03 ENCOUNTER — TELEPHONE (OUTPATIENT)
Age: 55
End: 2025-04-03

## 2025-04-03 ENCOUNTER — PATIENT MESSAGE (OUTPATIENT)
Age: 55
End: 2025-04-03

## 2025-04-03 NOTE — TELEPHONE ENCOUNTER
LM for pt to call back for her appt she has on Friday 4/4/25. Sherice is working 8-1 instead of 11-4. See if pt can come in earlier, if not we will have to reschedule.

## 2025-04-07 RX ORDER — NORTRIPTYLINE HYDROCHLORIDE 25 MG/1
CAPSULE ORAL
COMMUNITY
Start: 2025-03-05

## 2025-04-07 RX ORDER — NORTRIPTYLINE HYDROCHLORIDE 50 MG/1
CAPSULE ORAL
COMMUNITY
Start: 2025-03-24

## 2025-04-07 RX ORDER — ACETAMINOPHEN AND CODEINE PHOSPHATE 300; 30 MG/1; MG/1
TABLET ORAL
COMMUNITY
Start: 2025-02-09

## 2025-04-07 RX ORDER — CLINDAMYCIN PHOSPHATE 100 MG/1
SUPPOSITORY VAGINAL
COMMUNITY
Start: 2025-01-06

## 2025-04-07 RX ORDER — RIBOFLAVIN (VITAMIN B2) 100 MG
1 TABLET ORAL DAILY
COMMUNITY

## 2025-04-07 RX ORDER — ONDANSETRON 8 MG/1
TABLET, FILM COATED ORAL
COMMUNITY
Start: 2025-02-09

## 2025-04-08 ENCOUNTER — CONSULT (OUTPATIENT)
Age: 55
End: 2025-04-08
Payer: COMMERCIAL

## 2025-04-08 VITALS
WEIGHT: 148.2 LBS | HEART RATE: 79 BPM | HEIGHT: 62 IN | OXYGEN SATURATION: 98 % | SYSTOLIC BLOOD PRESSURE: 103 MMHG | BODY MASS INDEX: 27.27 KG/M2 | DIASTOLIC BLOOD PRESSURE: 70 MMHG

## 2025-04-08 DIAGNOSIS — Z01.818 PRE-OP EVALUATION: ICD-10-CM

## 2025-04-08 DIAGNOSIS — F33.0 MILD EPISODE OF RECURRENT MAJOR DEPRESSIVE DISORDER (HCC): ICD-10-CM

## 2025-04-08 DIAGNOSIS — M79.7 FIBROMYALGIA: ICD-10-CM

## 2025-04-08 DIAGNOSIS — K21.9 GASTROESOPHAGEAL REFLUX DISEASE, UNSPECIFIED WHETHER ESOPHAGITIS PRESENT: ICD-10-CM

## 2025-04-08 DIAGNOSIS — I49.3 MULTIPLE PREMATURE VENTRICULAR COMPLEXES: Primary | ICD-10-CM

## 2025-04-08 PROBLEM — G47.33 OBSTRUCTIVE SLEEP APNEA SYNDROME: Status: RESOLVED | Noted: 2023-09-07 | Resolved: 2025-04-08

## 2025-04-08 PROBLEM — E55.9 VITAMIN D DEFICIENCY: Status: RESOLVED | Noted: 2020-09-16 | Resolved: 2025-04-08

## 2025-04-08 PROCEDURE — 99214 OFFICE O/P EST MOD 30 MIN: CPT | Performed by: FAMILY MEDICINE

## 2025-04-08 NOTE — ASSESSMENT & PLAN NOTE
Continue cardiology follow-ups  with [facility]. Last appointment was in July

## 2025-04-08 NOTE — PROGRESS NOTES
Pre-operative Clearance  Name: Seema Poe      : 1970      MRN: [id number]  Encounter Provider: SHARAD Schaffer  Encounter Date: 2025   Encounter department: [facility]    :  Assessment & Plan  Multiple premature ventricular complexes  Continue cardiology follow-ups  with [facility]. Last appointment was in July            Gastroesophageal reflux disease, unspecified whether esophagitis present  Controlled with Omeprazole               Pre-op evaluation  Patient needs clearance from cardiology. Otherwise she can proceed with surgery.        Mild episode of recurrent major depressive disorder (HCC)  On Buspar. Controlled.         Fibromyalgia  Continue lyrica. Patient sees Dr. Prince                       Pre-operative Clearance:     Revised Cardiac Risk Index:  RCI RISK CLASS I (0 risk factors, risk of major cardiac complications approximately 0.5%)    Medication Instructions:   - 5HT3 Antagonist (ie, zofran):  Continue to take this medication on your normal schedule.   - Antidepressants: Continue to take this medication on your normal schedule.  - Antiepileptic meds: Continue to take this medication on your normal schedule.  - Buspirone: Continue to take this medication on your normal schedule.  - Opioids: Continue to take this medication on your normal schedule.  - Urinary Antispasmodics: Continue taking medication up to the evening before procedure/surgery, but do not take this medication on the morning of procedure/surgery.      Depression Screening and Follow-up Plan: Patient was screened for depression during today's encounter. They screened negative with a PHQ-9 score of 4.        History of Present Illness     Pre-Op Examination     Surgery: Total hystrectomy   Anticipated Date of Surgery: 2025   Surgeon: Dr. Bob     Patient will have total hysterectomy because of uterine polyps. Patient c/o lower back pain.     Previous history of  bleeding disorders or clots?: No    Previous Anesthesia reaction?: No    Prolonged steroid use in the last 6 months?: No      Assessment of Cardiac Risk:   - Unstable or severe angina or MI in the last 6 weeks or history of stent placement in the last year?: No    - Decompensated heart failure (e.g. New onset heart failure, NYHA  Class IV heart failure, or worsening existing heart failure)?: No    - Significant arrhythmias such as high grade AV block, symptomatic ventricular arrhythmia, newly recognized ventricular tachycardia, supraventricular tachycardia with resting heart rate >100, or symptomatic bradycardia?: No    - Severe heart valve disease including aortic stenosis or symptomatic mitral stenosis?: No      Pre-operative Risk Factors:  - Elevated-risk surgery: No    - History of cerebrovascular disease: No    - History of ischemic heart disease: No    - History of congestive heart failure: No    - Pre-operative treatment with insulin: No    - Pre-operative creatinine >2 mg/dL: No      Review of Systems   Constitutional:  Negative for chills and fever.   HENT:  Negative for ear pain and sore throat.    Eyes:  Negative for pain and visual disturbance.   Respiratory:  Negative for cough and shortness of breath.    Cardiovascular:  Negative for chest pain and palpitations.   Gastrointestinal:  Negative for abdominal pain and vomiting.        Acid reflux   Genitourinary:  Negative for dysuria and hematuria.   Musculoskeletal:  Positive for myalgias. Negative for arthralgias and back pain.   Skin:  Negative for color change and rash.   Neurological:  Negative for seizures and syncope.   Psychiatric/Behavioral:  Positive for dysphoric mood. The patient is nervous/anxious.    All other systems reviewed and are negative.    Past Medical History   Past Medical History:   Diagnosis Date    Anxiety     Arthritis     Fibromyalgia     Panic attacks      Past Surgical History:   Procedure Laterality Date    ABLATION  COLPOCLESIS      BREAST IMPLANT      TOE SURGERY Right     TUBAL LIGATION       History reviewed. No pertinent family history.  Social History     Tobacco Use    Smoking status: Never    Smokeless tobacco: Never   Vaping Use    Vaping status: Never Used   Substance and Sexual Activity    Alcohol use: Yes     Comment: Occ    Drug use: Never    Sexual activity: Not on file     Current Outpatient Medications on File Prior to Visit   Medication Sig    B Complex Vitamins (VITAMIN B COMPLEX PO)     busPIRone (BUSPAR) 5 mg tablet Take 1 tablet (5 mg total) by mouth 2 (two) times a day    celecoxib (CeleBREX) 200 mg capsule Take 200 mg by mouth daily    Cetirizine HCl 10 MG CAPS Take by mouth    Cleocin 100 MG vaginal suppository     glucosamine-chondroitin 500-400 MG tablet Take 1 tablet by mouth daily    MAGNESIUM CITRATE PO Take 1 tablet by mouth every other day    nortriptyline (PAMELOR) 50 mg capsule     omeprazole (PriLOSEC) 20 mg delayed release capsule Take 20 mg by mouth daily    ondansetron (ZOFRAN) 8 mg tablet     pregabalin (LYRICA) 100 mg capsule Take 100 mg by mouth 3 (three) times a day    tolterodine (DETROL LA) 4 mg 24 hr capsule Take 4 mg by mouth daily    TURMERIC PO Take by mouth    acetaminophen-codeine (TYLENOL with CODEINE #3) 300-30 MG per tablet  (Patient not taking: Reported on 4/8/2025)    buPROPion (WELLBUTRIN XL) 150 mg 24 hr tablet Take 1 tablet (150 mg total) by mouth every morning    cyclobenzaprine (FLEXERIL) 10 mg tablet Take 10 mg by mouth Three times daily as needed    ergocalciferol (VITAMIN D2) 50,000 units  (Patient not taking: Reported on 4/8/2025)    nortriptyline (PAMELOR) 10 mg capsule Take 10 mg by mouth daily (Patient not taking: Reported on 4/8/2025)    nortriptyline (PAMELOR) 25 mg capsule  (Patient not taking: Reported on 4/8/2025)    omeprazole (PriLOSEC) 10 mg delayed release capsule  (Patient not taking: Reported on 4/8/2025)    pregabalin (LYRICA) 50 mg capsule  (Patient  "not taking: Reported on 4/8/2025)    venlafaxine (EFFEXOR) 37.5 mg tablet Take 37.5 mg by mouth     Allergies   Allergen Reactions    Venlafaxine Nausea Only     Felt unwell    Duloxetine Nausea Only, Palpitations and GI Intolerance     Objective   /70 (BP Location: Left arm, Patient Position: Sitting, Cuff Size: Standard)   Pulse 79   Ht 5' 2\" (1.575 m)   Wt 67.2 kg (148 lb 3.2 oz)   SpO2 98%   BMI 27.11 kg/m²     Physical Exam  Vitals and nursing note reviewed.   Constitutional:       General: She is not in acute distress.     Appearance: She is well-developed.   HENT:      Head: Normocephalic and atraumatic.   Eyes:      Conjunctiva/sclera: Conjunctivae normal.   Cardiovascular:      Rate and Rhythm: Normal rate and regular rhythm.      Heart sounds: No murmur heard.  Pulmonary:      Effort: Pulmonary effort is normal. No respiratory distress.      Breath sounds: Normal breath sounds.   Abdominal:      Palpations: Abdomen is soft.      Tenderness: There is no abdominal tenderness.   Musculoskeletal:         General: No swelling.      Cervical back: Neck supple.   Skin:     General: Skin is warm and dry.      Capillary Refill: Capillary refill takes less than 2 seconds.   Neurological:      Mental Status: She is alert.   Psychiatric:         Mood and Affect: Mood normal.           SHARAD Schaffer  "

## 2025-04-08 NOTE — PATIENT INSTRUCTIONS
Pre-operative Medication Instructions    Avoid herbs or non-directed vitamins one week prior to surgery  Avoid aspirin containing medications or non-steroidal anti-inflammatory drugs one week preceding surgery  May take tylenol for pain up until the night before surgery    5HT3 Antagonists     Medication Name     ondansetron (ZOFRAN) 8 mg tablet      Continue to take this medication on your normal schedule.  If this is an oral medication and you take it in the morning, then you may take this medicine with a sip of water.    Antidepressant Meds     Medication Name     nortriptyline (PAMELOR) 50 mg capsule     buPROPion (WELLBUTRIN XL) 150 mg 24 hr tablet     nortriptyline (PAMELOR) 10 mg capsule     nortriptyline (PAMELOR) 25 mg capsule     venlafaxine (EFFEXOR) 37.5 mg tablet      Continue to take this medication on your normal schedule.  If this is an oral medication and you take it in the morning, then you may take this medicine with a sip of water.    Seizure Medication     Medication Name     pregabalin (LYRICA) 100 mg capsule     pregabalin (LYRICA) 50 mg capsule      Continue to take this medication on your normal schedule.  If this is an oral medication and you take it in the morning, then you may take this medicine with a sip of water.    Buspirone     Medication Name     busPIRone (BUSPAR) 5 mg tablet      Continue to take this medication on your normal schedule.  If this is an oral medication and you take it in the morning, then you may take this medicine with a sip of water.    Opioid Medications     Medication Name     acetaminophen-codeine (TYLENOL with CODEINE #3) 300-30 MG per tablet      Continue to take this medication on your normal schedule.  If this is an oral medication and you take it in the morning, then you may take this medicine with a sip of water.    Urinary Antispasmodic Medications     Medication Name     tolterodine (DETROL LA) 4 mg 24 hr capsule      Continue this medication up to the  evening before surgery/procedure, but do not take the morning of the day of surgery.

## 2025-04-28 DIAGNOSIS — F41.9 ANXIETY: ICD-10-CM

## 2025-04-28 RX ORDER — BUSPIRONE HYDROCHLORIDE 5 MG/1
5 TABLET ORAL 2 TIMES DAILY
Qty: 60 TABLET | Refills: 6 | Status: SHIPPED | OUTPATIENT
Start: 2025-04-28

## 2025-05-13 ENCOUNTER — OFFICE VISIT (OUTPATIENT)
Age: 55
End: 2025-05-13
Payer: COMMERCIAL

## 2025-05-13 VITALS
OXYGEN SATURATION: 95 % | HEIGHT: 62 IN | HEART RATE: 87 BPM | DIASTOLIC BLOOD PRESSURE: 70 MMHG | SYSTOLIC BLOOD PRESSURE: 106 MMHG | BODY MASS INDEX: 27.22 KG/M2 | WEIGHT: 147.93 LBS

## 2025-05-13 DIAGNOSIS — Z76.89 ENCOUNTER TO ESTABLISH CARE: Primary | ICD-10-CM

## 2025-05-13 DIAGNOSIS — M48.061 SPINAL STENOSIS OF LUMBAR REGION, UNSPECIFIED WHETHER NEUROGENIC CLAUDICATION PRESENT: ICD-10-CM

## 2025-05-13 DIAGNOSIS — F41.9 ANXIETY: ICD-10-CM

## 2025-05-13 DIAGNOSIS — R25.1 TREMOR OF BOTH HANDS: ICD-10-CM

## 2025-05-13 DIAGNOSIS — R35.0 URINARY FREQUENCY: ICD-10-CM

## 2025-05-13 DIAGNOSIS — K21.9 GASTROESOPHAGEAL REFLUX DISEASE, UNSPECIFIED WHETHER ESOPHAGITIS PRESENT: ICD-10-CM

## 2025-05-13 DIAGNOSIS — Z13.0 SCREENING FOR DEFICIENCY ANEMIA: ICD-10-CM

## 2025-05-13 DIAGNOSIS — R00.2 PALPITATIONS: ICD-10-CM

## 2025-05-13 DIAGNOSIS — G89.0 CENTRAL PAIN SYNDROME: ICD-10-CM

## 2025-05-13 DIAGNOSIS — Z12.31 ENCOUNTER FOR SCREENING MAMMOGRAM FOR BREAST CANCER: ICD-10-CM

## 2025-05-13 DIAGNOSIS — Z13.6 SCREENING FOR CARDIOVASCULAR CONDITION: ICD-10-CM

## 2025-05-13 DIAGNOSIS — Z13.29 SCREENING FOR THYROID DISORDER: ICD-10-CM

## 2025-05-13 PROBLEM — Z87.42 HISTORY OF ENDOMETRIAL HYPERPLASIA: Status: ACTIVE | Noted: 2025-03-25

## 2025-05-13 PROCEDURE — 81002 URINALYSIS NONAUTO W/O SCOPE: CPT

## 2025-05-13 PROCEDURE — 99203 OFFICE O/P NEW LOW 30 MIN: CPT

## 2025-05-13 RX ORDER — OMEPRAZOLE 20 MG/1
20 CAPSULE, DELAYED RELEASE ORAL DAILY
Qty: 90 CAPSULE | Refills: 0 | Status: SHIPPED | OUTPATIENT
Start: 2025-05-13

## 2025-05-13 NOTE — ASSESSMENT & PLAN NOTE
Pt reports that she has on and off palpitations; patient states that she follows with cardiology and had a heart monitor      Patient will sign consent for records;

## 2025-05-13 NOTE — ASSESSMENT & PLAN NOTE
Stable denies complaints or concerns    You can access the FollowMyHealth Patient Portal offered by [facility] by registering at the following website: [email or web address]. By joining [facility]’s FollowMyHealth portal, you will also be able to view your health information using other applications (apps) compatible with our system.

## 2025-05-13 NOTE — PATIENT INSTRUCTIONS
Labs are walk in, patients do not need appointments or lab slips. Blood work is fasting (no food) 8-10 hours before draw. Please no food or liquid other than water and black coffee. Lab results will be sent to provider, provider will reach out regarding results via My Chart messaging or phone call.

## 2025-05-13 NOTE — PROGRESS NOTES
Name: Seema Poe      : 1970      MRN: [id number]  Encounter Provider: Sherice Alonso  Encounter Date: 2025   Encounter department: [facility] PRIMARY [facility]  :  Assessment & Plan  Encounter to establish care  Pt seen in the office today to establish care; denies complaints or concerns    Reviewed medical, surgical and family history   UTD on dental exams   UTD on vision- patient has an appointment on 2025    Routine labs ordered   Gastroesophageal reflux disease, unspecified whether esophagitis present  Stable; denies complaints or concerns   Central pain syndrome  Pt follows with neurology - next appointment is on 2025  Anxiety  Stable denies complaints or concerns   Spinal stenosis of lumbar region, unspecified whether neurogenic claudication present  Stable denies complaints or concerns     Tremor of both hands  Pt follows with neurology - next appointment is on 2025    Palpitations  Pt reports that she has on and off palpitations; patient states that she follows with cardiology and had a heart monitor      Patient will sign consent for records;     Encounter for screening mammogram for breast cancer    Orders:    Mammo screening bilateral w 3d and cad; Future    Screening for cardiovascular condition    Orders:    Comprehensive metabolic panel; Future    Hemoglobin A1C; Future    Lipid panel; Future    Screening for deficiency anemia    Orders:    CBC and differential; Future    Screening for thyroid disorder    Orders:    TSH, 3rd generation with Free T4 reflex; Future    Urinary frequency  Pt reports urinary frequency that has been on and off- reports mild burning with urination-     POCT was negative       Patient recently had a hysterotomy and has a follow up appointment with GYN on Friday   Orders:    POCT urine dip           History of Present Illness   HPI  Seema Poe is a 54 year old female seen in the office today to establish care; denies complaints  "or concerns    Reviewed medical, surgical and family history   UTD on dental exams   UTD on vision- patient has an appointment on 05/19/2025    Routine labs ordered   Review of Systems   Constitutional:  Negative for chills and fever.   HENT:  Negative for ear pain and sore throat.    Eyes:  Negative for pain and visual disturbance.   Respiratory:  Negative for cough and shortness of breath.    Cardiovascular:  Negative for chest pain and palpitations.   Gastrointestinal:  Negative for abdominal pain and vomiting.   Genitourinary:  Negative for dysuria and hematuria.   Musculoskeletal:  Negative for arthralgias and back pain.   Skin:  Negative for color change and rash.   Neurological:  Negative for dizziness, seizures, syncope and headaches.   All other systems reviewed and are negative.      Objective   /70 (BP Location: Left arm, Patient Position: Sitting, Cuff Size: Adult)   Pulse 87   Ht 5' 2\" (1.575 m)   Wt 67.1 kg (147 lb 14.9 oz)   SpO2 95%   BMI 27.06 kg/m²      Physical Exam  Vitals and nursing note reviewed.   Constitutional:       General: She is not in acute distress.     Appearance: She is well-developed.   HENT:      Head: Normocephalic and atraumatic.   Eyes:      Conjunctiva/sclera: Conjunctivae normal.   Cardiovascular:      Rate and Rhythm: Normal rate and regular rhythm.      Pulses: Normal pulses.      Heart sounds: Normal heart sounds. No murmur heard.  Pulmonary:      Effort: Pulmonary effort is normal. No respiratory distress.      Breath sounds: Normal breath sounds.   Musculoskeletal:         General: No swelling.      Cervical back: Neck supple.   Skin:     General: Skin is warm and dry.   Neurological:      Mental Status: She is alert.   Psychiatric:         Mood and Affect: Mood normal.         "

## 2025-05-29 ENCOUNTER — APPOINTMENT (OUTPATIENT)
Age: 55
End: 2025-05-29
Payer: COMMERCIAL

## 2025-05-29 DIAGNOSIS — Z13.6 SCREENING FOR ISCHEMIC HEART DISEASE: ICD-10-CM

## 2025-05-29 DIAGNOSIS — Z13.0 SCREENING FOR DEFICIENCY ANEMIA: ICD-10-CM

## 2025-05-29 DIAGNOSIS — Z13.29 THYROID DISORDER SCREENING: ICD-10-CM

## 2025-05-29 LAB
CHOLEST SERPL-MCNC: 212 MG/DL (ref ?–200)
EST. AVERAGE GLUCOSE BLD GHB EST-MCNC: 120 MG/DL
HBA1C MFR BLD: 5.8 %
HDLC SERPL-MCNC: 42 MG/DL
LDLC SERPL CALC-MCNC: 127 MG/DL (ref 0–100)
NONHDLC SERPL-MCNC: 170 MG/DL
TRIGL SERPL-MCNC: 217 MG/DL (ref ?–150)
TSH SERPL DL<=0.05 MIU/L-ACNC: 2.07 UIU/ML (ref 0.45–4.5)

## 2025-05-29 PROCEDURE — 84443 ASSAY THYROID STIM HORMONE: CPT

## 2025-05-29 PROCEDURE — 80061 LIPID PANEL: CPT

## 2025-05-29 PROCEDURE — 36415 COLL VENOUS BLD VENIPUNCTURE: CPT

## 2025-05-29 PROCEDURE — 83036 HEMOGLOBIN GLYCOSYLATED A1C: CPT

## 2025-05-30 ENCOUNTER — TELEPHONE (OUTPATIENT)
Age: 55
End: 2025-05-30

## 2025-05-30 ENCOUNTER — RESULTS FOLLOW-UP (OUTPATIENT)
Age: 55
End: 2025-05-30

## 2025-05-30 NOTE — PROGRESS NOTES
6/9/2025      No chief complaint on file.    Assessment and Plan    54 y.o. female managed by New patient       1. Slow urinary stream  Assessment & Plan:  Slow urinary stream- ongoing for some time now   PVR-151  UA-negative for blood and infection   No prolapse was noted on exam from 4/2025  May be related to some of patient's medications such as Flexeril, Effexor, nortriptyline, detrol which can impaire voiding- additionally reduces bladder contractions causing urinary retention or hesitancy  Reviewed with patient today   I would recommend discontinuing OAB medication   Discussed double voiding, timing voiding, discussing with prescibing providers alternative medication options along with PFPT to assist with emptying status  She deferred PFPT   Plan to trial Hytrin   Reviewed usage and side effects   Plan to get renal and bladder US with PVR in 2 months to reassess emptying status and assess upper urinary tract structures  Can consider UDS if symptoms are ongoing   Plan to follow up in 2 months with US prior to review   Orders:  -     POCT Measure PVR  -     POCT urine dip auto non-scope  -     terazosin (HYTRIN) 1 mg capsule; Take 1 capsule (1 mg total) by mouth daily at bedtime  -     US kidney and bladder with pvr; Future; Expected date: 08/06/2025        History of Present Illness  Seema Poe is a 54 y.o. female here for evaluation of slow urinary stream and straining to urinate. She reports this has been ongoing for some time now. She feels that her urine just trickles out. She mainly drinks water and feels that she drinks a decent amount. She is on nortriptyline and detrol which she recently started in march for urge incontinence- she reports not taking it daily due to the dry mouth. She also started it for bladder spasms.  She is unsure if her symptoms are worse when she takes the medication. She additionally reports stress incontinence. She was referred to PFPT years ago and did attend. She reports  "that she had discomfort with PFPT and stopped.    5/16 postoperative visit following robotic assisted total hysterectomy bilateral salpingo-oophorectomy and cystoscopy. No concern for prolapse on exam      No surgeries ever on her kidneys or bladder     No spinal cord injuries     Does have lower back pain     No hx of diabetes     No hx of kidney stone. No gross hematuria     She does follow with neurology for intermittent back pain, which she attributes to her returning to her usual work activities. She describes a sensation of tightness in her back and hip, with the pain predominantly localized to the left side. She also reports pain in the bilateral calves with associated numbness/tingling. Her last visit was 1/2025- at this time she was having no bladder or bowel dysfunction. She is considering lumbar surgical intervention.     MRI- 4/21/2024- mild degenerative changes     Review of Systems   Constitutional:  Negative for chills and fever.   HENT:  Negative for ear pain and sore throat.    Eyes:  Negative for pain and visual disturbance.   Respiratory:  Negative for cough and shortness of breath.    Cardiovascular:  Negative for chest pain and palpitations.   Gastrointestinal:  Negative for abdominal pain and vomiting.   Genitourinary:  Positive for difficulty urinating and urgency. Negative for decreased urine volume, dysuria, flank pain and hematuria.   Musculoskeletal:  Positive for back pain. Negative for arthralgias.   Skin:  Negative for color change and rash.   Neurological:  Negative for seizures and syncope.   All other systems reviewed and are negative.               Vitals  Vitals:    06/09/25 0842   BP: 114/66   BP Location: Left arm   Patient Position: Sitting   Cuff Size: Standard   Pulse: 82   SpO2: 96%   Weight: 68.5 kg (151 lb)   Height: 5' 2\" (1.575 m)       Physical Exam  Constitutional:       Appearance: Normal appearance.   HENT:      Head: Normocephalic and atraumatic.   Pulmonary:      " "Effort: Pulmonary effort is normal.     Musculoskeletal:         General: Normal range of motion.      Cervical back: Normal range of motion.     Neurological:      General: No focal deficit present.      Mental Status: She is alert and oriented to person, place, and time. Mental status is at baseline.     Psychiatric:         Mood and Affect: Mood normal.         Behavior: Behavior normal.         Thought Content: Thought content normal.           Past History  Past Medical History[1]  Social History[2]  Tobacco Use History[3]  Family History[4]    The following portions of the patient's history were reviewed and updated as appropriate: allergies, current medications, past medical history, past social history, past surgical history and problem list.    Results  Recent Results (from the past hour)   POCT Measure PVR    Collection Time: 06/09/25  9:03 AM   Result Value Ref Range    POST-VOID RESIDUAL VOLUME, ML  mL   POCT urine dip auto non-scope    Collection Time: 06/09/25  9:05 AM   Result Value Ref Range     COLOR,UA yellow     CLARITY,UA clear     SPECIFIC GRAVITY,UA <=1.005      PH,UA 6     LEUKOCYTE ESTERASE,UA neg     NITRITE,UA neg     GLUCOSE, UA neg     KETONES,UA neg     BILIRUBIN,UA neg     BLOOD,UA neg     POCT URINE PROTEIN neg     SL AMB POCT UROBILINOGEN 0.2    ]  No results found for: \"PSA\"  Lab Results   Component Value Date    CALCIUM 9.4 05/29/2025    K 4.4 05/29/2025    CO2 31 05/29/2025    CL 99 05/29/2025    BUN 14 05/29/2025    CREATININE 0.76 05/29/2025     Lab Results   Component Value Date    WBC 6.32 05/29/2025    HGB 14.7 05/29/2025    HCT 44.0 05/29/2025    MCV 87 05/29/2025     05/29/2025              [1]  Past Medical History:  Diagnosis Date   • Anxiety    • Arthritis    • Fibromyalgia    • Panic attacks    [2]  Social History  Socioeconomic History   • Marital status:    Tobacco Use   • Smoking status: Never   • Smokeless tobacco: Never   Vaping Use   • Vaping " status: Never Used   Substance and Sexual Activity   • Alcohol use: Yes     Comment: Occ   • Drug use: Never   [3]  Social History  Tobacco Use   Smoking Status Never   Smokeless Tobacco Never   [4]  Family History  Problem Relation Name Age of Onset   • Diabetes Mother     • Graves' disease Mother     • Diabetes Father     • Stroke Maternal Grandmother     • Brain Aneurysm Maternal Grandmother     • Cancer Maternal Grandfather     • Diabetes Paternal Grandmother     • Abdominal aortic aneurysm Paternal Grandfather     • Breast cancer Maternal Aunt

## 2025-05-30 NOTE — TELEPHONE ENCOUNTER
New Patient      Insurance   Current Insurance?[employer]    Insurance E-verified?     History   Reason for appointment/active symptoms?  Patient called stating she is having slower urine stream for a long time and she has to push to started.  She does have bladder spasms .  She takes medication Detrol LA generic Tolterodine by her GYN>      Has the patient had any previous Urologist(s)? no      Was the patient seen in the ED?      Labs/Imaging(Including Out Of Network)?      Records Requested?   Records Visible in EPIC?      Personal history of cancer? no      Appointment   Office location preference:  [place]   ?   Appointment Details   Date:  06/09/25  Time:  840 am   Location:  [place]   Provider:Adilene Eng   Does the appointment need further review?

## 2025-06-03 ENCOUNTER — TELEPHONE (OUTPATIENT)
Age: 55
End: 2025-06-03

## 2025-06-03 NOTE — TELEPHONE ENCOUNTER
Pt called in to schedule a new patient appointment  pt currently establish with a rheum doctor out of [facility] . I advise the patient she will need a referral to schedule an appointment . Patient will contact her PCP to place in a referral

## 2025-06-05 DIAGNOSIS — G89.0 CENTRAL PAIN SYNDROME: Primary | ICD-10-CM

## 2025-06-05 DIAGNOSIS — M79.7 FIBROMYALGIA: ICD-10-CM

## 2025-06-09 ENCOUNTER — OFFICE VISIT (OUTPATIENT)
Dept: UROLOGY | Facility: MEDICAL CENTER | Age: 55
End: 2025-06-09
Payer: COMMERCIAL

## 2025-06-09 VITALS
OXYGEN SATURATION: 96 % | SYSTOLIC BLOOD PRESSURE: 114 MMHG | DIASTOLIC BLOOD PRESSURE: 66 MMHG | HEART RATE: 82 BPM | BODY MASS INDEX: 27.79 KG/M2 | HEIGHT: 62 IN | WEIGHT: 151 LBS

## 2025-06-09 DIAGNOSIS — R39.198 SLOW URINARY STREAM: Primary | ICD-10-CM

## 2025-06-09 LAB
POST-VOID RESIDUAL VOLUME, ML POC: 151 ML
SL AMB  POCT GLUCOSE, UA: NORMAL
SL AMB LEUKOCYTE ESTERASE,UA: NORMAL
SL AMB POCT BILIRUBIN,UA: NORMAL
SL AMB POCT BLOOD,UA: NORMAL
SL AMB POCT CLARITY,UA: CLEAR
SL AMB POCT COLOR,UA: YELLOW
SL AMB POCT KETONES,UA: NORMAL
SL AMB POCT NITRITE,UA: NORMAL
SL AMB POCT PH,UA: 6
SL AMB POCT SPECIFIC GRAVITY,UA: <=1.005
SL AMB POCT URINE PROTEIN: NORMAL
SL AMB POCT UROBILINOGEN: 0.2

## 2025-06-09 PROCEDURE — 81003 URINALYSIS AUTO W/O SCOPE: CPT

## 2025-06-09 PROCEDURE — 51798 US URINE CAPACITY MEASURE: CPT

## 2025-06-09 PROCEDURE — 99204 OFFICE O/P NEW MOD 45 MIN: CPT

## 2025-06-09 RX ORDER — TERAZOSIN 1 MG/1
1 CAPSULE ORAL
Qty: 90 CAPSULE | Refills: 3 | Status: SHIPPED | OUTPATIENT
Start: 2025-06-09 | End: 2026-06-04

## 2025-06-09 RX ORDER — DICYCLOMINE HYDROCHLORIDE 10 MG/1
CAPSULE ORAL
COMMUNITY
Start: 2025-06-05

## 2025-06-09 NOTE — ASSESSMENT & PLAN NOTE
Slow urinary stream- ongoing for some time now   PVR-151  UA-negative for blood and infection   No prolapse was noted on exam from 4/2025  May be related to some of patient's medications such as Flexeril, Effexor, nortriptyline, detrol which can impaire voiding- additionally reduces bladder contractions causing urinary retention or hesitancy  Reviewed with patient today   I would recommend discontinuing OAB medication   Discussed double voiding, timing voiding, discussing with prescibing providers alternative medication options along with PFPT to assist with emptying status  She deferred PFPT   Plan to trial Hytrin   Reviewed usage and side effects   Plan to get renal and bladder US with PVR in 2 months to reassess emptying status and assess upper urinary tract structures  Can consider UDS if symptoms are ongoing   Plan to follow up in 2 months with US prior to review

## 2025-06-30 ENCOUNTER — APPOINTMENT (OUTPATIENT)
Age: 55
End: 2025-06-30
Payer: COMMERCIAL

## 2025-06-30 ENCOUNTER — CONSULT (OUTPATIENT)
Dept: RHEUMATOLOGY | Facility: CLINIC | Age: 55
End: 2025-06-30
Payer: COMMERCIAL

## 2025-06-30 VITALS
HEART RATE: 91 BPM | BODY MASS INDEX: 28.16 KG/M2 | WEIGHT: 153 LBS | OXYGEN SATURATION: 98 % | HEIGHT: 62 IN | SYSTOLIC BLOOD PRESSURE: 110 MMHG | DIASTOLIC BLOOD PRESSURE: 68 MMHG

## 2025-06-30 DIAGNOSIS — M79.7 FIBROMYALGIA: ICD-10-CM

## 2025-06-30 DIAGNOSIS — G89.0 CENTRAL PAIN SYNDROME: ICD-10-CM

## 2025-06-30 DIAGNOSIS — M50.30 DDD (DEGENERATIVE DISC DISEASE), CERVICAL: Primary | ICD-10-CM

## 2025-06-30 DIAGNOSIS — M51.369 DEGENERATION OF INTERVERTEBRAL DISC OF LUMBAR REGION, UNSPECIFIED WHETHER PAIN PRESENT: ICD-10-CM

## 2025-06-30 DIAGNOSIS — M50.30 DDD (DEGENERATIVE DISC DISEASE), CERVICAL: ICD-10-CM

## 2025-06-30 DIAGNOSIS — M15.0 PRIMARY GENERALIZED (OSTEO)ARTHRITIS: ICD-10-CM

## 2025-06-30 DIAGNOSIS — M15.9 OSTEOARTHRITIS OF MULTIPLE JOINTS, UNSPECIFIED OSTEOARTHRITIS TYPE: ICD-10-CM

## 2025-06-30 DIAGNOSIS — M51.34 DDD (DEGENERATIVE DISC DISEASE), THORACIC: ICD-10-CM

## 2025-06-30 LAB — RHEUMATOID FACT SERPL-ACNC: <10 IU/ML

## 2025-06-30 PROCEDURE — 99204 OFFICE O/P NEW MOD 45 MIN: CPT | Performed by: INTERNAL MEDICINE

## 2025-06-30 PROCEDURE — 36415 COLL VENOUS BLD VENIPUNCTURE: CPT

## 2025-06-30 PROCEDURE — 86431 RHEUMATOID FACTOR QUANT: CPT | Performed by: INTERNAL MEDICINE

## 2025-06-30 PROCEDURE — 86200 CCP ANTIBODY: CPT

## 2025-06-30 NOTE — ASSESSMENT & PLAN NOTE
Orders:    Ambulatory Referral to Rheumatology    RHEUMATOID FACTOR; Future    Cyclic citrul peptide antibody, IgG; Future

## 2025-06-30 NOTE — PROGRESS NOTES
Name: Seema Poe      : 1970      MRN: [id number]  Encounter Provider: Rigo Causey MD  Encounter Date: 2025   Encounter department: [facility] RHEUMATOLOGY [facility]  :  Assessment & Plan  Fibromyalgia  Chronic arthralgias, myalgias, fatigue and increased sensitivity to touch suggestive fibromyalgia    I have reviewed fibromyalgia and recommended online resource [facility] Fibroguide website    I have advised her rheumatology here consultative only and further management/recommendations per primary provider and/or pain management    Would have low threshold to r/o sleep related disorders    Recommend low impact aerobics, yoga, francisco javier chi, aqua therapy    Could consider FDA approved medications such as Cymbalta, Savellla, Lyrica or other off label medications such as Gabapentin, Flexeril, Elavil per discretion of primary health care provider    At this time low index of suspicion for inflammatory/autoimmune rheumatic disorders    Will review test results and f/u if any concern for inflammatory rheumatic disorder.    Continue Lyrica     Thank you for involving me in this patient's care.    Rigo Causey MD  [facility] Rheumatology      Orders:  •  Ambulatory Referral to Rheumatology  •  RHEUMATOID FACTOR; Future  •  Cyclic citrul peptide antibody, IgG; Future    DDD (degenerative disc disease), cervical  F/u pain management   Orders:  •  RHEUMATOID FACTOR; Future  •  Cyclic citrul peptide antibody, IgG; Future    Degeneration of intervertebral disc of lumbar region, unspecified whether pain present  F/u pain management   Orders:  •  RHEUMATOID FACTOR; Future  •  Cyclic citrul peptide antibody, IgG; Future    DDD (degenerative disc disease), thoracic  F/u pain management   Orders:  •  RHEUMATOID FACTOR; Future  •  Cyclic citrul peptide antibody, IgG; Future    Primary generalized (osteo)arthritis  Celebrex as needed  Orders:  •  RHEUMATOID FACTOR; Future  •  Cyclic citrul peptide  antibody, IgG; Future      This is a Rheumatology Consult seen at the request of Dr. Alonso      History of Present Illness   HPI  Seema Poe is a 54 y.o. female who presents for further evaluation chronic arthralgias, fibromyalgia  History obtained from: patient    She has past medical history GERD, chronic cervical pain, chronic back pain, anxiety, fibromyalgia.    Was seeing outside rheumatologist Dr. Jose Antonio Prince rheumatologist @ [facility]    Also follows with Dr Kate Gtz neurologist [facility] and gets trigger point injections to her cervical spine    Chronic cervical and lumbar pain, DDD, radiculopathy follows with Dr Wally Domingo pain management [facility] and gets injections    Chronic arthralgias, myalgias, fatigue > 6 years ago    On Lyrica per her rheumatologist Dr Prince    W/u HLA B 27, RF, CCP, CRP reviewed and normal    MRI cervical, thoracic and lumbar spine c/w OA, DDD    Review of Systems  Pertinent Medical History       --------------------------------------------------------------------------------------------------------        ROS:      All other ROS was reviewed and negative except as above         --------------------------------------------------------------------------------------------------------    Past Medical History    Past Medical History:  Diagnosis Date   • Anxiety    • Arthritis    • Fibromyalgia    • Panic attacks            Past Surgical History    Past Surgical History:  Procedure Laterality Date   • ABLATION COLPOCLESIS     • BREAST IMPLANT     • HYSTERECTOMY     • TOE SURGERY Right    • TUBAL LIGATION             Family History    Family History  Problem Relation Name Age of Onset   • Diabetes Mother     • Graves' disease Mother     • Diabetes Father     • Stroke Maternal Grandmother     • Brain Aneurysm Maternal Grandmother     • Cancer Maternal Grandfather     • Diabetes Paternal Grandmother     • Abdominal aortic aneurysm Paternal Grandfather     •  Breast cancer Maternal Aunt              Social History    Social History  Tobacco Use   • Smoking status: Never   • Smokeless tobacco: Never   Vaping Use   • Vaping status: Never Used   Substance Use Topics   • Alcohol use: Yes     Comment: Occ   • Drug use: Never         Allergies    Allergies  Allergen Reactions   • Venlafaxine Nausea Only     Felt unwell   • Duloxetine Nausea Only, Palpitations and GI Intolerance         Medications    Current Outpatient Medications   Medication Instructions   • B Complex Vitamins (VITAMIN B COMPLEX PO)    • busPIRone (BUSPAR) 5 mg, Oral, 2 times daily   • celecoxib (CELEBREX) 200 mg, Daily   • Cetirizine HCl 10 MG CAPS Take by mouth   • cyclobenzaprine (FLEXERIL) 10 mg, Oral, 3 times daily PRN   • dicyclomine (BENTYL) 10 mg capsule    • glucosamine-chondroitin 500-400 MG tablet 1 tablet, Daily   • MAGNESIUM CITRATE PO 1 tablet, Every other day   • nortriptyline (PAMELOR) 50 mg capsule    • omeprazole (PRILOSEC) 20 mg, Oral, Daily   • pregabalin (LYRICA) 100 mg, 3 times daily   • terazosin (HYTRIN) 1 mg, Oral, Daily at bedtime   • tolterodine (DETROL LA) 4 mg, Daily   • TURMERIC PO Take by mouth          ________________________________________________________________________    Results Review    Component  Ref Range & Units 9/26/24 10:48 AM   Rheumatoid Factor Antibody IgG  <=6 U 5   Rheumatoid Factor Antibody IgA  <=6 U <5   Rheumatoid Factor Antibody IgM  <=6 U        CCP AUTOANTIBODY  Order: [id number]   suggestion  Information displayed in this report may not trend or trigger automated decision support.     Component  Ref Range & Units 2 yr ago   Cyclic Citrullinated Peptide Ab (IgG)  <6.0 RU/mL <3.0     Specimen Collected: 04/19/23  4:33 PM    Performed by: [facility] ([email or web address]) Last Resulted: 04/20/23 12:26 PM   Received From: [facility]  Result Received: 01/28/24  3:04 PM    View Encounter        Received Information  C-REACTIVE PROTEIN  Order:  [id number]  Status: Edited Result - FINAL        Component  Ref Range & Units (hover) 2 yr ago   C-Reactive Protein <1.0             Specimen Collected: 04/19/23  4:33 PM    Performed By: [facility] ([email or web address]) Last Resulted: 04/19/23  8:36 PM   Received From: [facility]  Result Received: 05/13/24 11:38 AM       Received Information     View Full Report  HLA-B27  Order: [id number]   suggestion  Information displayed in this report may not trend or trigger automated decision support.     Component 2 yr ago   Sample Date 04/19/2023   Specimen Source PERIPHERAL BLOOD   HLA-B27 Antigen Negative   HLA-B27 Interpretation HLA B27 is stron     2/24      MRI CERVICAL SPINE W WO CONTRAST  Order: [id number]  Impression    1. No cord signal abnormality or pathologic enhancement  2. Multilevel degenerative changes with spinal canal stenosis most predominant at C4-5 C5-6  3. Neuroforaminal narrowing most predominant on the right at C4-5      [facility]     MRI THORACIC SPINE W WO CONTRAST  Order: [id number]  Impression    Impression:    Negative for demyelination.    Mild degenerative changes.     MRI LUMBAR SPINE WO CONTRAST  Order: [id number]  Impression    Mild progression of degenerative change at L4/L5 with moderate central canal stenosis and mild bilateral neural foraminal narrowing. No focal disc herniation identified at any level.    [facility]      Results  XR hand 3+ vw right (Order [id number])     XR hand 3+ vw right  Order: [id number]  Impression    Unremarkable 3 view right hand series.      [facility]            Dictation By:   Eduardo Espino MD   This report has been electronically signed by:   Eduardo Espino MD   3/25/2025 10:09 AM  Narrative    CLINICAL HISTORY:  Bilateral hand pain    COMPARISON: None    TECHNIQUE:  3 view right hand series was performed    FINDINGS:  There is normal mineralization. No fracture is identified. Joint  "spaces are maintained. There is no chondrocalcinosis. Carpal bones are unremarkable. Distal radius and ulna are normal.                Objective   /68   Pulse 91   Ht 5' 2\" (1.575 m)   Wt 69.4 kg (153 lb)   SpO2 98%   BMI 27.98 kg/m²      Physical Exam    GEN: AAO, No apparent distress.  Patient is well developed.  HEENT:  Pupils are equal, round and reactive.  Sclera are clear.  Fundoscopic exam is normal.  External ears are without lesions.  Oral pharynx is clear of ulcers or other lesions.  MMM.   NECK:  Supple.  There is no adenopathy appreciable in anterior or posterior cervical chains or supraclavicularly.  JVP is normal.    HEART: Regular rate and rhythm.  There is no appreciable murmur, gallop or rub.  LUNGS: Clear to auscultation.  ABD:  Soft, without tenderness, rebound or guarding.  No appreciable organomegally.  NEURO: Speech and cognition are normal.  Strength is 5/5 throughout.  Tone is normal.  DTRs are 2/4 at the knees, ankles and elbows.  Gait is normal.  SKIN: There are no rashes or lesions    MUSCULOSKELETAL:   + tender points fibromyalgia  + roberto OA          Thank you for involving me in this patient's care.        Rigo Causey MD  [facility] Rheumatology  "

## 2025-07-12 LAB — CCP AB SER IA-ACNC: 1.4 (ref ?–10)

## 2025-07-16 ENCOUNTER — RA CDI HCC (OUTPATIENT)
Dept: OTHER | Facility: HOSPITAL | Age: 55
End: 2025-07-16

## 2025-07-23 ENCOUNTER — HOSPITAL ENCOUNTER (OUTPATIENT)
Dept: ULTRASOUND IMAGING | Facility: MEDICAL CENTER | Age: 55
Discharge: HOME | End: 2025-07-23
Payer: COMMERCIAL

## 2025-07-23 ENCOUNTER — OFFICE VISIT (OUTPATIENT)
Age: 55
End: 2025-07-23
Payer: COMMERCIAL

## 2025-07-23 VITALS
DIASTOLIC BLOOD PRESSURE: 78 MMHG | HEIGHT: 62 IN | SYSTOLIC BLOOD PRESSURE: 116 MMHG | WEIGHT: 151.68 LBS | TEMPERATURE: 97.6 F | OXYGEN SATURATION: 98 % | BODY MASS INDEX: 27.91 KG/M2 | HEART RATE: 78 BPM | RESPIRATION RATE: 20 BRPM

## 2025-07-23 DIAGNOSIS — M48.061 SPINAL STENOSIS OF LUMBAR REGION, UNSPECIFIED WHETHER NEUROGENIC CLAUDICATION PRESENT: ICD-10-CM

## 2025-07-23 DIAGNOSIS — R52 GENERALIZED PAIN: Primary | ICD-10-CM

## 2025-07-23 DIAGNOSIS — R68.2 DRY MOUTH: ICD-10-CM

## 2025-07-23 DIAGNOSIS — M25.472 ANKLE EDEMA, BILATERAL: ICD-10-CM

## 2025-07-23 DIAGNOSIS — M25.471 ANKLE EDEMA, BILATERAL: ICD-10-CM

## 2025-07-23 DIAGNOSIS — R39.198 SLOW URINARY STREAM: ICD-10-CM

## 2025-07-23 PROBLEM — F33.0 MILD EPISODE OF RECURRENT MAJOR DEPRESSIVE DISORDER (HCC): Status: RESOLVED | Noted: 2021-03-22 | Resolved: 2025-07-23

## 2025-07-23 PROBLEM — Z98.890 POST-OPERATIVE STATE: Status: ACTIVE | Noted: 2025-07-03

## 2025-07-23 PROCEDURE — 99214 OFFICE O/P EST MOD 30 MIN: CPT

## 2025-07-23 PROCEDURE — 76770 US EXAM ABDO BACK WALL COMP: CPT

## 2025-07-23 RX ORDER — IBUPROFEN 600 MG/1
600 TABLET, FILM COATED ORAL EVERY 8 HOURS SCHEDULED
COMMUNITY
Start: 2025-04-29 | End: 2025-07-23

## 2025-07-23 RX ORDER — NORTRIPTYLINE HYDROCHLORIDE 25 MG/1
25 CAPSULE ORAL DAILY
COMMUNITY
Start: 2025-07-16

## 2025-07-23 RX ORDER — METHOCARBAMOL 500 MG/1
500 TABLET, FILM COATED ORAL 4 TIMES DAILY
COMMUNITY
Start: 2025-07-16 | End: 2025-07-26

## 2025-07-23 NOTE — ASSESSMENT & PLAN NOTE
Pt reports history of spinal stenosis; follows with neurology, ortho and pain management    Patient reports mild numbness or tingling that comes and goes but decline loss of function     Patient reports that when she is having back pain it causes diarrheal; patient states that there is no loss of function of bladder or bowel during episodes     Educated on when to seek emergent care-

## 2025-07-23 NOTE — PROGRESS NOTES
Name: Seema Poe      : 1970      MRN: [id number]  Encounter Provider: Sherice Alonso  Encounter Date: 2025   Encounter department: [facility].    Vitals:    25 1209   BP: 116/78   Pulse: 78   Resp: 20   Temp: 97.6 °F (36.4 °C)   SpO2: 98%       Assessment & Plan  Generalized pain  Patient seen in the office today for generalized pain; patient follows with rheumatology, pain management and neurology regarding diagnosis of central pain syndrome    Patient reports that she does not think that pain is controlled; patient states that she has an appointment with pain management next month    Patient multiple complaints or joint pain and muscle pain with activity; patient reports that it affects her personal life and work     Ankle edema, bilateral  Pt reports on and off ankle edema; no edema noted on exam; denies    Educated on lifestyle modification and when to follow up sooner    Dry mouth  Pt reports dry mouth due to mediation management; patient reports that she is having trouble swallowing certain foods due to dryness; denies choking on food or water;     Patient will discuss with pain  management next month     Educated on lifestyle modification and when to seek emergent care- pt verbalized understanding   Spinal stenosis of lumbar region, unspecified whether neurogenic claudication present  Pt reports history of spinal stenosis; follows with neurology, ortho and pain management    Patient reports mild numbness or tingling that comes and goes but decline loss of function     Patient reports that when she is having back pain it causes diarrheal; patient states that there is no loss of function of bladder or bowel during episodes     Educated on when to seek emergent care-           History of Present Illness   HPI  Seema Poe is a 54 year old female seen in the office for multiple complaints; reviewed each complaints; patient follows with neurology, pain  "management and ortho for multiple issues; educated on when to seek emergent care and when to follow up sooner    Patient states that she has an appointment with pain management next month and will discuss issues with them     Review of Systems   Constitutional:  Negative for chills and fever.   HENT:  Negative for ear pain and sore throat.    Eyes:  Negative for pain and visual disturbance.   Respiratory:  Negative for cough and shortness of breath.    Cardiovascular:  Negative for chest pain and palpitations.   Gastrointestinal:  Negative for abdominal pain and vomiting.   Genitourinary:  Negative for dysuria and hematuria.   Musculoskeletal:  Positive for arthralgias, back pain and myalgias.   Skin:  Negative for color change and rash.   Neurological:  Negative for dizziness, seizures, syncope and headaches.   All other systems reviewed and are negative.      Objective   /78 (BP Location: Left arm, Patient Position: Sitting, Cuff Size: Standard)   Pulse 78   Temp 97.6 °F (36.4 °C) (Temporal)   Resp 20   Ht 5' 2\" (1.575 m)   Wt 68.8 kg (151 lb 10.8 oz)   SpO2 98%   BMI 27.74 kg/m²      Physical Exam  Vitals and nursing note reviewed.   Constitutional:       General: She is not in acute distress.     Appearance: She is well-developed.   HENT:      Head: Normocephalic and atraumatic.     Eyes:      Conjunctiva/sclera: Conjunctivae normal.       Cardiovascular:      Rate and Rhythm: Normal rate and regular rhythm.      Pulses: Normal pulses.      Heart sounds: Normal heart sounds. No murmur heard.  Pulmonary:      Effort: Pulmonary effort is normal. No respiratory distress.      Breath sounds: Normal breath sounds.     Musculoskeletal:      Cervical back: Neck supple.     Skin:     General: Skin is warm and dry.      Capillary Refill: Capillary refill takes less than 2 seconds.     Neurological:      Mental Status: She is alert.     Psychiatric:         Mood and Affect: Mood normal.         "

## 2025-08-05 ENCOUNTER — OFFICE VISIT (OUTPATIENT)
Dept: UROLOGY | Facility: MEDICAL CENTER | Age: 55
End: 2025-08-05
Payer: COMMERCIAL

## 2025-08-05 VITALS
OXYGEN SATURATION: 98 % | SYSTOLIC BLOOD PRESSURE: 104 MMHG | HEIGHT: 62 IN | DIASTOLIC BLOOD PRESSURE: 62 MMHG | BODY MASS INDEX: 27.79 KG/M2 | WEIGHT: 151 LBS | HEART RATE: 85 BPM

## 2025-08-05 DIAGNOSIS — R39.198 SLOW URINARY STREAM: Primary | ICD-10-CM

## 2025-08-05 PROCEDURE — 99214 OFFICE O/P EST MOD 30 MIN: CPT

## 2025-08-05 RX ORDER — TERAZOSIN 1 MG/1
1 CAPSULE ORAL
Qty: 90 CAPSULE | Refills: 3 | Status: SHIPPED | OUTPATIENT
Start: 2025-08-05 | End: 2026-07-31

## 2025-08-05 RX ORDER — TOLTERODINE 2 MG/1
2 CAPSULE, EXTENDED RELEASE ORAL DAILY
Qty: 30 CAPSULE | Refills: 0 | Status: SHIPPED | OUTPATIENT
Start: 2025-08-05

## 2025-08-06 ENCOUNTER — TELEPHONE (OUTPATIENT)
Age: 55
End: 2025-08-06